# Patient Record
Sex: FEMALE | Race: WHITE | Employment: OTHER | URBAN - METROPOLITAN AREA
[De-identification: names, ages, dates, MRNs, and addresses within clinical notes are randomized per-mention and may not be internally consistent; named-entity substitution may affect disease eponyms.]

---

## 2017-07-19 ENCOUNTER — OFFICE VISIT (OUTPATIENT)
Dept: FAMILY MEDICINE CLINIC | Facility: CLINIC | Age: 62
End: 2017-07-19

## 2017-07-19 VITALS
OXYGEN SATURATION: 98 % | SYSTOLIC BLOOD PRESSURE: 122 MMHG | WEIGHT: 130 LBS | TEMPERATURE: 98 F | HEIGHT: 63.5 IN | BODY MASS INDEX: 22.75 KG/M2 | DIASTOLIC BLOOD PRESSURE: 80 MMHG | HEART RATE: 98 BPM

## 2017-07-19 DIAGNOSIS — M81.0 AGE-RELATED OSTEOPOROSIS WITHOUT CURRENT PATHOLOGICAL FRACTURE: Chronic | ICD-10-CM

## 2017-07-19 DIAGNOSIS — K58.2 IRRITABLE BOWEL SYNDROME WITH BOTH CONSTIPATION AND DIARRHEA: ICD-10-CM

## 2017-07-19 DIAGNOSIS — Z12.4 SCREENING FOR MALIGNANT NEOPLASM OF CERVIX: ICD-10-CM

## 2017-07-19 DIAGNOSIS — Z00.00 ROUTINE MEDICAL EXAM: Primary | ICD-10-CM

## 2017-07-19 DIAGNOSIS — K63.5 BENIGN COLON POLYP: ICD-10-CM

## 2017-07-19 DIAGNOSIS — Z12.11 SCREENING FOR COLON CANCER: ICD-10-CM

## 2017-07-19 DIAGNOSIS — Z12.31 SCREENING MAMMOGRAM, ENCOUNTER FOR: ICD-10-CM

## 2017-07-19 PROCEDURE — 90471 IMMUNIZATION ADMIN: CPT | Performed by: FAMILY MEDICINE

## 2017-07-19 PROCEDURE — 99386 PREV VISIT NEW AGE 40-64: CPT | Performed by: FAMILY MEDICINE

## 2017-07-19 PROCEDURE — 90715 TDAP VACCINE 7 YRS/> IM: CPT | Performed by: FAMILY MEDICINE

## 2017-07-19 RX ORDER — HYOSCYAMINE SULFATE 0.125 MG
TABLET ORAL
Qty: 40 TABLET | Refills: 1 | Status: SHIPPED | OUTPATIENT
Start: 2017-07-19 | End: 2019-04-16 | Stop reason: ALTCHOICE

## 2017-07-19 RX ORDER — AZELAIC ACID 0.15 G/G
GEL TOPICAL 2 TIMES DAILY
COMMUNITY

## 2017-07-19 NOTE — H&P
HPI:   Patient presents with:  Physical: mammogram  Diarrhea      Regina Segundo is a 58year old female who presents for a complete physical exam without pap/gyne exam.     Patient has new complaints of:  · Rare episodes of explosive watery diarrhea fibrilation [OTHER] Father    • Hyperlipidemia [OTHER] Father    • Dementia Mother    • Depression Mother    • Osteoporosis Stellahe Johnston Mother    • Heart Disease Paternal Grandfather    • Asthma Paternal Grandmother    • Osteoporosis [OTHER] Paternal Marva Monroe 63.5\"   Wt 130 lb   SpO2 98%   BMI 22.67 kg/m²  Estimated body mass index is 22.67 kg/m² as calculated from the following:    Height as of this encounter: 63.5\". Weight as of this encounter: 130 lb.   Wt Readings from Last 3 Encounters:  07/19/17 : 130 osteoporosis. · Recommend dietary calcium and Vit D  · Fasting Lipids, Vit D (if covered by insurance), CMP, TSH and CBC annually: due now  · Screening Colonoscopy: q 5 years as h/o colon polyps, last 2015-no polyps/NL,  [facility]  · Immunizations:  Tdap ,

## 2017-07-24 ENCOUNTER — LAB ENCOUNTER (OUTPATIENT)
Dept: LAB | Age: 62
End: 2017-07-24
Attending: FAMILY MEDICINE
Payer: COMMERCIAL

## 2017-07-24 DIAGNOSIS — M81.0 AGE-RELATED OSTEOPOROSIS WITHOUT CURRENT PATHOLOGICAL FRACTURE: Chronic | ICD-10-CM

## 2017-07-24 DIAGNOSIS — Z00.00 ROUTINE MEDICAL EXAM: ICD-10-CM

## 2017-07-24 LAB
25-HYDROXYVITAMIN D (TOTAL): 8.3 NG/ML (ref 30–100)
ALBUMIN SERPL-MCNC: 3.8 G/DL (ref 3.5–4.8)
ALP LIVER SERPL-CCNC: 91 U/L (ref 50–130)
ALT SERPL-CCNC: 26 U/L (ref 14–54)
AST SERPL-CCNC: 17 U/L (ref 15–41)
BASOPHILS # BLD AUTO: 0.05 X10(3) UL (ref 0–0.1)
BASOPHILS NFR BLD AUTO: 1 %
BILIRUB SERPL-MCNC: 0.5 MG/DL (ref 0.1–2)
BUN BLD-MCNC: 7 MG/DL (ref 8–20)
CALCIUM BLD-MCNC: 9.1 MG/DL (ref 8.3–10.3)
CHLORIDE: 108 MMOL/L (ref 101–111)
CHOLEST SMN-MCNC: 196 MG/DL (ref ?–200)
CO2: 26 MMOL/L (ref 22–32)
CREAT BLD-MCNC: 0.61 MG/DL (ref 0.55–1.02)
EOSINOPHIL # BLD AUTO: 0.07 X10(3) UL (ref 0–0.3)
EOSINOPHIL NFR BLD AUTO: 1.4 %
ERYTHROCYTE [DISTWIDTH] IN BLOOD BY AUTOMATED COUNT: 12.1 % (ref 11.5–16)
GLUCOSE BLD-MCNC: 92 MG/DL (ref 70–99)
HCT VFR BLD AUTO: 41.8 % (ref 34–50)
HDLC SERPL-MCNC: 61 MG/DL (ref 45–?)
HDLC SERPL: 3.21 {RATIO} (ref ?–4.44)
HGB BLD-MCNC: 13.4 G/DL (ref 12–16)
IMMATURE GRANULOCYTE COUNT: 0.01 X10(3) UL (ref 0–1)
IMMATURE GRANULOCYTE RATIO %: 0.2 %
LDLC SERPL CALC-MCNC: 110 MG/DL (ref ?–130)
LDLC SERPL-MCNC: 25 MG/DL (ref 5–40)
LYMPHOCYTES # BLD AUTO: 1.85 X10(3) UL (ref 0.9–4)
LYMPHOCYTES NFR BLD AUTO: 36.9 %
M PROTEIN MFR SERPL ELPH: 7.1 G/DL (ref 6.1–8.3)
MCH RBC QN AUTO: 29.7 PG (ref 27–33.2)
MCHC RBC AUTO-ENTMCNC: 32.1 G/DL (ref 31–37)
MCV RBC AUTO: 92.7 FL (ref 81–100)
MONOCYTES # BLD AUTO: 0.52 X10(3) UL (ref 0.1–0.6)
MONOCYTES NFR BLD AUTO: 10.4 %
NEUTROPHIL ABS PRELIM: 2.52 X10 (3) UL (ref 1.3–6.7)
NEUTROPHILS # BLD AUTO: 2.52 X10(3) UL (ref 1.3–6.7)
NEUTROPHILS NFR BLD AUTO: 50.1 %
NONHDLC SERPL-MCNC: 135 MG/DL (ref ?–130)
PLATELET # BLD AUTO: 225 10(3)UL (ref 150–450)
POTASSIUM SERPL-SCNC: 3.9 MMOL/L (ref 3.6–5.1)
RBC # BLD AUTO: 4.51 X10(6)UL (ref 3.8–5.1)
RED CELL DISTRIBUTION WIDTH-SD: 41.2 FL (ref 35.1–46.3)
SODIUM SERPL-SCNC: 142 MMOL/L (ref 136–144)
TRIGLYCERIDES: 123 MG/DL (ref ?–150)
TSI SER-ACNC: 1.41 MIU/ML (ref 0.35–5.5)
WBC # BLD AUTO: 5 X10(3) UL (ref 4–13)

## 2017-07-24 PROCEDURE — 80061 LIPID PANEL: CPT

## 2017-07-24 PROCEDURE — 36415 COLL VENOUS BLD VENIPUNCTURE: CPT

## 2017-07-24 PROCEDURE — 82306 VITAMIN D 25 HYDROXY: CPT

## 2017-07-24 PROCEDURE — 84443 ASSAY THYROID STIM HORMONE: CPT

## 2017-07-24 PROCEDURE — 80053 COMPREHEN METABOLIC PANEL: CPT

## 2017-07-24 PROCEDURE — 85025 COMPLETE CBC W/AUTO DIFF WBC: CPT

## 2017-07-27 DIAGNOSIS — E55.9 VITAMIN D DEFICIENCY: Primary | ICD-10-CM

## 2017-07-27 RX ORDER — ERGOCALCIFEROL 1.25 MG/1
50000 CAPSULE ORAL WEEKLY
Qty: 12 CAPSULE | Refills: 0 | Status: SHIPPED | OUTPATIENT
Start: 2017-07-27 | End: 2017-09-26

## 2017-08-01 ENCOUNTER — HOSPITAL (OUTPATIENT)
Dept: OTHER | Age: 62
End: 2017-08-01
Attending: FAMILY MEDICINE

## 2017-08-07 ENCOUNTER — TELEPHONE (OUTPATIENT)
Dept: FAMILY MEDICINE CLINIC | Facility: CLINIC | Age: 62
End: 2017-08-07

## 2017-08-19 ENCOUNTER — TELEPHONE (OUTPATIENT)
Dept: FAMILY MEDICINE CLINIC | Facility: CLINIC | Age: 62
End: 2017-08-19

## 2017-09-26 ENCOUNTER — LAB ENCOUNTER (OUTPATIENT)
Dept: LAB | Age: 62
End: 2017-09-26
Attending: FAMILY MEDICINE
Payer: COMMERCIAL

## 2017-09-26 ENCOUNTER — OFFICE VISIT (OUTPATIENT)
Dept: FAMILY MEDICINE CLINIC | Facility: CLINIC | Age: 62
End: 2017-09-26

## 2017-09-26 VITALS
HEART RATE: 80 BPM | OXYGEN SATURATION: 98 % | HEIGHT: 63.25 IN | WEIGHT: 126 LBS | TEMPERATURE: 98 F | DIASTOLIC BLOOD PRESSURE: 70 MMHG | BODY MASS INDEX: 22.05 KG/M2 | SYSTOLIC BLOOD PRESSURE: 118 MMHG

## 2017-09-26 DIAGNOSIS — F41.9 ANXIETY: ICD-10-CM

## 2017-09-26 DIAGNOSIS — E55.9 VITAMIN D DEFICIENCY: ICD-10-CM

## 2017-09-26 DIAGNOSIS — R19.7 DIARRHEA, UNSPECIFIED TYPE: Primary | ICD-10-CM

## 2017-09-26 DIAGNOSIS — K58.2 IRRITABLE BOWEL SYNDROME WITH BOTH CONSTIPATION AND DIARRHEA: ICD-10-CM

## 2017-09-26 DIAGNOSIS — H10.13 ALLERGIC CONJUNCTIVITIS, BILATERAL: ICD-10-CM

## 2017-09-26 DIAGNOSIS — R19.7 DIARRHEA, UNSPECIFIED TYPE: ICD-10-CM

## 2017-09-26 LAB — IGA SERPL-MCNC: 205 MG/DL (ref 68–378)

## 2017-09-26 PROCEDURE — 82784 ASSAY IGA/IGD/IGG/IGM EACH: CPT

## 2017-09-26 PROCEDURE — 36415 COLL VENOUS BLD VENIPUNCTURE: CPT

## 2017-09-26 PROCEDURE — 83516 IMMUNOASSAY NONANTIBODY: CPT

## 2017-09-26 PROCEDURE — 99214 OFFICE O/P EST MOD 30 MIN: CPT | Performed by: FAMILY MEDICINE

## 2017-09-26 PROCEDURE — 82306 VITAMIN D 25 HYDROXY: CPT

## 2017-09-26 RX ORDER — ESCITALOPRAM OXALATE 10 MG/1
TABLET ORAL
Qty: 30 TABLET | Refills: 1 | Status: SHIPPED | OUTPATIENT
Start: 2017-09-26 | End: 2019-04-16 | Stop reason: ALTCHOICE

## 2017-09-26 RX ORDER — ERGOCALCIFEROL 1.25 MG/1
50000 CAPSULE ORAL WEEKLY
Qty: 12 CAPSULE | Refills: 1 | Status: SHIPPED | OUTPATIENT
Start: 2017-09-26 | End: 2017-10-09

## 2017-09-26 RX ORDER — OLOPATADINE HYDROCHLORIDE 1 MG/ML
1 SOLUTION/ DROPS OPHTHALMIC 2 TIMES DAILY
Qty: 5 ML | Refills: 3 | Status: SHIPPED | OUTPATIENT
Start: 2017-09-26 | End: 2019-04-16

## 2017-09-26 NOTE — PROGRESS NOTES
HPI:   Patient presents with:  Diarrhea: for 4 months   Cade Duarte is a 58year old female who presents for chronic diarrhea x 4 months and associated anxiety    Watery diarrhea abdominal pain:  · She also complains of: an Rfl:    Hyoscyamine Sulfate 0.125 MG Oral Tab 1-2 tabs q 6-8 hours prn IBS Disp: 40 tablet Rfl: 1      Past Medical History:   Diagnosis Date   • Fibromyalgia    • Irritable bowel syndrome    • Osteopenia    • PONV (postoperative nausea and vomiting) intact B UE and LE, nl gait  MS: no significant deformity, FROM B UE/LE  PSYCH:  mood and affect mildly anxious but otherwise within normal limits, speech and thought congruent, no SHIP  PSYCH: Appearance: well groomed and neatly dressed    ASSESSMENT AND present management: Vitamin D 50,000 units weekly through winter at a minimum, will recheck in about 6-9 months, follow-up with me then  - ergocalciferol 19039 units Oral Cap; Take 1 capsule (50,000 Units total) by mouth once a week.   Dispense: 12 capsule;

## 2017-09-27 LAB
25(OH)D3 SERPL-MCNC: 42.1 NG/ML
TTG IGA SER-ACNC: 0.5 U/ML (ref ?–7)

## 2017-10-09 DIAGNOSIS — E55.9 VITAMIN D DEFICIENCY: ICD-10-CM

## 2017-10-09 RX ORDER — ERGOCALCIFEROL 1.25 MG/1
CAPSULE ORAL
Qty: 12 CAPSULE | Refills: 1 | Status: SHIPPED | OUTPATIENT
Start: 2017-10-09 | End: 2019-04-16 | Stop reason: ALTCHOICE

## 2019-02-19 ENCOUNTER — HOSPITAL (OUTPATIENT)
Dept: OTHER | Age: 64
End: 2019-02-19
Attending: FAMILY MEDICINE

## 2019-04-16 ENCOUNTER — OFFICE VISIT (OUTPATIENT)
Dept: FAMILY MEDICINE CLINIC | Facility: CLINIC | Age: 64
End: 2019-04-16
Payer: COMMERCIAL

## 2019-04-16 VITALS
BODY MASS INDEX: 23.1 KG/M2 | RESPIRATION RATE: 16 BRPM | WEIGHT: 132 LBS | DIASTOLIC BLOOD PRESSURE: 80 MMHG | SYSTOLIC BLOOD PRESSURE: 126 MMHG | OXYGEN SATURATION: 98 % | HEIGHT: 63.5 IN | HEART RATE: 78 BPM

## 2019-04-16 DIAGNOSIS — E55.9 VITAMIN D DEFICIENCY: ICD-10-CM

## 2019-04-16 DIAGNOSIS — Z12.11 SCREENING FOR COLON CANCER: ICD-10-CM

## 2019-04-16 DIAGNOSIS — H10.13 ALLERGIC CONJUNCTIVITIS, BILATERAL: ICD-10-CM

## 2019-04-16 DIAGNOSIS — Z00.00 ROUTINE MEDICAL EXAM: Primary | ICD-10-CM

## 2019-04-16 DIAGNOSIS — L60.3 NAIL DYSTROPHY: ICD-10-CM

## 2019-04-16 DIAGNOSIS — M81.0 AGE-RELATED OSTEOPOROSIS WITHOUT CURRENT PATHOLOGICAL FRACTURE: ICD-10-CM

## 2019-04-16 PROBLEM — F41.1 GENERALIZED ANXIETY DISORDER: Status: ACTIVE | Noted: 2019-04-16

## 2019-04-16 PROCEDURE — 90750 HZV VACC RECOMBINANT IM: CPT | Performed by: FAMILY MEDICINE

## 2019-04-16 PROCEDURE — 99396 PREV VISIT EST AGE 40-64: CPT | Performed by: FAMILY MEDICINE

## 2019-04-16 PROCEDURE — 90471 IMMUNIZATION ADMIN: CPT | Performed by: FAMILY MEDICINE

## 2019-04-16 RX ORDER — TERBINAFINE HYDROCHLORIDE 250 MG/1
250 TABLET ORAL DAILY
COMMUNITY
End: 2019-07-02 | Stop reason: ALTCHOICE

## 2019-04-16 RX ORDER — OLOPATADINE HYDROCHLORIDE 1 MG/ML
1 SOLUTION/ DROPS OPHTHALMIC 2 TIMES DAILY
Qty: 5 ML | Refills: 3 | Status: SHIPPED | OUTPATIENT
Start: 2019-04-16

## 2019-04-16 NOTE — H&P
HPI:   Patient presents with:  Belgica Freed is a 59year old female who presents for a complete physical exam without pap/gyne exam.     Patient has new complaints of:  · F/u osteoporosis, last DEXA 8/2017-8.5% decr in lumbar spine, n History:   Procedure Laterality Date   • ARTHROSCOPY, SHOULDER, SURGI Left    • BREAST BIOPSY Left 1991   • BSO, OMENTECTOMY W/MEAGAN      Age 37   • COLONOSCOPY N/A 8/6/2015    Performed by Chel Powers MD at [facility] ENDOSCOPY   • HYSTERECTOMY     • IR CENTRAL No vision change or complaints  EARS:  No hearing loss, no ear pain  MOUTH/THROAT:  No sore throat or dental problems, no oral lesions  HEART:  No chest pain or palpitations  LUNG:  No SOB, cough or wheeze  GI:  No abdominal pain.   No N/V/D/C  :  No dysu exam.  She is in good health. Her weight is stable, Estimated body mass index is 23.02 kg/m² as calculated from the following:    Height as of this encounter: 63.5\". Weight as of this encounter: 132 lb. .   Discussed importance of exercise and healthy now-See above  - VITAMIN D, 25-HYDROXY; Future      The patient indicates understanding of the above recommendations and agrees to the above plan.   Follow up: as above    Orders Placed This Encounter      Vitamin D, 25-Hydroxy [E]      CBC      CMP      LI

## 2019-05-14 ENCOUNTER — TELEPHONE (OUTPATIENT)
Dept: FAMILY MEDICINE CLINIC | Facility: CLINIC | Age: 64
End: 2019-05-14

## 2019-05-14 DIAGNOSIS — Z00.00 ROUTINE MEDICAL EXAM: Primary | ICD-10-CM

## 2019-05-14 NOTE — TELEPHONE ENCOUNTER
Rcvd request for H&P from [facility]. Pt scheduled for cataract surgery:  R eye 7/10/19  L eye 8/7/19  Pt will have a monitored anesthesia care (MAC) local anesthesia together with sedation and analgesia.    LOV 4/16/19 Routine Exam/Phx; ok t

## 2019-05-15 NOTE — TELEPHONE ENCOUNTER
PT was left VM regarding her H & P being fine for pre op but needs additional labs prior to the surgery. Pt advised to call back.

## 2019-05-20 NOTE — TELEPHONE ENCOUNTER
Spoke to pt, she will have labs completed but wants to confirm an EKG is necessary. Pt has paperwork from [facility], which states she does not need labs or an EKG unless PCP recommends.  Would you like the patient to complete the EKG (did in

## 2019-05-21 NOTE — TELEPHONE ENCOUNTER
If Surgeon does not require EKG then OK not to do pre-op, but she has not had EKG since 2015 (last in chart) so goood to do for routine  screening purposes antway

## 2019-05-21 NOTE — TELEPHONE ENCOUNTER
Spoke with patient, advised Dr. Tony Aguayo suggests if surgeon does not require EKG then ok not to do pre-op. Advised to still complete EKG for screening purposes since last was done in 2015. Patient verbalizes understanding.

## 2019-06-13 DIAGNOSIS — E55.9 VITAMIN D DEFICIENCY: Primary | ICD-10-CM

## 2019-06-13 DIAGNOSIS — E78.5 DYSLIPIDEMIA: ICD-10-CM

## 2019-07-02 ENCOUNTER — OFFICE VISIT (OUTPATIENT)
Dept: FAMILY MEDICINE CLINIC | Facility: CLINIC | Age: 64
End: 2019-07-02
Payer: COMMERCIAL

## 2019-07-02 VITALS
TEMPERATURE: 98 F | BODY MASS INDEX: 22.23 KG/M2 | WEIGHT: 127 LBS | OXYGEN SATURATION: 98 % | RESPIRATION RATE: 17 BRPM | SYSTOLIC BLOOD PRESSURE: 100 MMHG | HEART RATE: 77 BPM | HEIGHT: 63.5 IN | DIASTOLIC BLOOD PRESSURE: 62 MMHG

## 2019-07-02 DIAGNOSIS — H25.9 AGE-RELATED CATARACT OF BOTH EYES, UNSPECIFIED AGE-RELATED CATARACT TYPE: ICD-10-CM

## 2019-07-02 DIAGNOSIS — K59.09 OTHER CONSTIPATION: ICD-10-CM

## 2019-07-02 DIAGNOSIS — E78.5 DYSLIPIDEMIA: ICD-10-CM

## 2019-07-02 DIAGNOSIS — Z01.818 PRE-OPERATIVE GENERAL PHYSICAL EXAMINATION: Primary | ICD-10-CM

## 2019-07-02 DIAGNOSIS — Z23 NEED FOR SHINGLES VACCINE: ICD-10-CM

## 2019-07-02 PROCEDURE — 99244 OFF/OP CNSLTJ NEW/EST MOD 40: CPT | Performed by: FAMILY MEDICINE

## 2019-07-02 NOTE — H&P
HPI:   Patient presents with:  Pre-Op Exam: [facility]. Cataract extraction with intraocular lens implant (MAC anesthesia) with Dr. Pauline Ferrari; R 7/10/2019, L 8/7/2019.  Needed: H&P clearance  Constipation  Hyperlipidemia: test done after SHOULDER, SURGI Left    • BREAST BIOPSY Left 1991   • BSO, OMENTECTOMY W/MEAGAN      Age 37   • COLONOSCOPY N/A 8/6/2015    Performed by Ulices Matute MD at [facility] ENDOSCOPY   • HYSTERECTOMY     • IR CENTRAL LINE (TLC) [facility] Pulse 77   Temp 97.8 °F (36.6 °C) (Oral)   Resp 17   Ht 63.5\"   Wt 127 lb   SpO2 98%   BMI 22.14 kg/m²  Body mass index is 22.14 kg/m².   GENERAL: well developed, well nourished, female  in no apparent distress  SKIN: no suspicious lesions,  skin color wn age-related cataract type  See above    3.  Dyslipidemia, new onset, may be due to recent 3-month course of Lamisil, now complete  Not on medication, recommend low-fat and cholesterol diet, check calcium score the heart, check EKG, recheck lipids in 6 month

## 2019-07-16 ENCOUNTER — TELEPHONE (OUTPATIENT)
Dept: FAMILY MEDICINE CLINIC | Facility: CLINIC | Age: 64
End: 2019-07-16

## 2019-07-16 NOTE — TELEPHONE ENCOUNTER
Rcvd pt's medical record-Colonoscopy dated 6/18/2018. Per  last colonoscopy 8/6/15 (5 yr plan). Enter new date and change  to show next Colonoscopy in 2023?

## 2019-08-13 ENCOUNTER — TELEPHONE (OUTPATIENT)
Dept: FAMILY MEDICINE CLINIC | Facility: CLINIC | Age: 64
End: 2019-08-13

## 2019-08-20 ENCOUNTER — NURSE ONLY (OUTPATIENT)
Dept: FAMILY MEDICINE CLINIC | Facility: CLINIC | Age: 64
End: 2019-08-20
Payer: COMMERCIAL

## 2019-08-20 DIAGNOSIS — Z23 NEED FOR SHINGLES VACCINE: Primary | ICD-10-CM

## 2019-08-20 PROCEDURE — 90471 IMMUNIZATION ADMIN: CPT | Performed by: FAMILY MEDICINE

## 2019-08-20 PROCEDURE — 90750 HZV VACC RECOMBINANT IM: CPT | Performed by: FAMILY MEDICINE

## 2019-08-20 NOTE — PROGRESS NOTES
Patient comes to office for Shingrix #2 vaccine  Vaccine consent form & waver signed. Vaccine inj'd to patient's L deltoid  Patient tolerated well with no complications. Patient advised to go to ER with any adverse reactions.   Patient verbalizes Kedar Shepard

## 2020-02-19 ENCOUNTER — APPOINTMENT (OUTPATIENT)
Dept: LAB | Age: 65
End: 2020-02-19
Attending: FAMILY MEDICINE
Payer: COMMERCIAL

## 2020-02-19 ENCOUNTER — HOSPITAL ENCOUNTER (OUTPATIENT)
Dept: MAMMOGRAPHY | Age: 65
Discharge: HOME | End: 2020-02-20
Attending: FAMILY MEDICINE

## 2020-02-19 ENCOUNTER — HOSPITAL ENCOUNTER (OUTPATIENT)
Dept: BONE DENSITY | Age: 65
Discharge: HOME | End: 2020-02-19
Attending: FAMILY MEDICINE
Payer: COMMERCIAL

## 2020-02-19 DIAGNOSIS — E78.5 DYSLIPIDEMIA: ICD-10-CM

## 2020-02-19 DIAGNOSIS — E55.9 VITAMIN D DEFICIENCY: ICD-10-CM

## 2020-02-19 DIAGNOSIS — M81.0 AGE-RELATED OSTEOPOROSIS WITHOUT CURRENT PATHOLOGICAL FRACTURE: ICD-10-CM

## 2020-02-19 DIAGNOSIS — Z12.31 ENCOUNTER FOR SCREENING MAMMOGRAM FOR MALIGNANT NEOPLASM OF BREAST: ICD-10-CM

## 2020-02-19 DIAGNOSIS — Z12.31 ENCOUNTER FOR SCREENING MAMMOGRAM FOR MALIGNANT NEOPLASM OF BREAST: Primary | ICD-10-CM

## 2020-02-19 LAB
CHOLEST SMN-MCNC: 224 MG/DL (ref ?–200)
HDLC SERPL-MCNC: 58 MG/DL (ref 40–59)
LDLC SERPL CALC-MCNC: 128 MG/DL (ref ?–100)
NONHDLC SERPL-MCNC: 166 MG/DL (ref ?–130)
PATIENT FASTING Y/N/NP: YES
TRIGL SERPL-MCNC: 189 MG/DL (ref 30–149)
VIT D+METAB SERPL-MCNC: 27.7 NG/ML (ref 30–100)
VLDLC SERPL CALC-MCNC: 38 MG/DL (ref 0–30)

## 2020-02-19 PROCEDURE — 82306 VITAMIN D 25 HYDROXY: CPT | Performed by: FAMILY MEDICINE

## 2020-02-19 PROCEDURE — 80061 LIPID PANEL: CPT | Performed by: FAMILY MEDICINE

## 2020-02-19 PROCEDURE — 77080 DXA BONE DENSITY AXIAL: CPT | Performed by: FAMILY MEDICINE

## 2020-02-19 PROCEDURE — 77063 BREAST TOMOSYNTHESIS BI: CPT

## 2020-02-19 PROCEDURE — 36415 COLL VENOUS BLD VENIPUNCTURE: CPT | Performed by: FAMILY MEDICINE

## 2020-02-20 ENCOUNTER — TELEPHONE (OUTPATIENT)
Dept: FAMILY MEDICINE CLINIC | Facility: CLINIC | Age: 65
End: 2020-02-20

## 2020-02-20 NOTE — TELEPHONE ENCOUNTER
Rec'd pt's mammogram report done on 2/19/2020. Findings: scattered fibroglandular elements in both breasts. No significant masses, calcifications or findings seen. 1 year mammogram recommended. Entered in HM.

## 2020-02-22 DIAGNOSIS — E55.9 VITAMIN D DEFICIENCY: Primary | ICD-10-CM

## 2020-02-22 DIAGNOSIS — E78.5 DYSLIPIDEMIA: ICD-10-CM

## 2021-03-15 DIAGNOSIS — Z23 NEED FOR VACCINATION: ICD-10-CM

## 2021-04-02 ENCOUNTER — HOSPITAL ENCOUNTER (OUTPATIENT)
Dept: MAMMOGRAPHY | Age: 66
Discharge: HOME | End: 2021-04-02
Attending: FAMILY MEDICINE

## 2021-04-02 ENCOUNTER — TELEPHONE (OUTPATIENT)
Dept: INTEGRATIVE MEDICINE | Facility: CLINIC | Age: 66
End: 2021-04-02

## 2021-04-02 DIAGNOSIS — Z12.31 ENCOUNTER FOR SCREENING MAMMOGRAM FOR BREAST CANCER: ICD-10-CM

## 2021-04-02 PROCEDURE — 77067 SCR MAMMO BI INCL CAD: CPT

## 2021-04-02 PROCEDURE — 77063 BREAST TOMOSYNTHESIS BI: CPT

## 2021-04-05 ENCOUNTER — TELEPHONE (OUTPATIENT)
Dept: FAMILY MEDICINE CLINIC | Facility: CLINIC | Age: 66
End: 2021-04-05

## 2021-04-05 NOTE — TELEPHONE ENCOUNTER
Received patient's mammogram results from [address] [place] [address]. No recent imaging ordered by Dr. Melvina Waddell. LOV 7/2/19 for pre-op; due 7/20/2020 for CPE. No upcoming appts scheduled. Report placed on provider's desk for review.

## 2021-04-20 ENCOUNTER — MED REC SCAN ONLY (OUTPATIENT)
Dept: FAMILY MEDICINE CLINIC | Facility: CLINIC | Age: 66
End: 2021-04-20

## 2021-06-10 ENCOUNTER — MED REC SCAN ONLY (OUTPATIENT)
Dept: FAMILY MEDICINE CLINIC | Facility: CLINIC | Age: 66
End: 2021-06-10

## 2021-08-25 ENCOUNTER — OFFICE VISIT (OUTPATIENT)
Dept: FAMILY MEDICINE CLINIC | Facility: CLINIC | Age: 66
End: 2021-08-25
Payer: COMMERCIAL

## 2021-08-25 VITALS
WEIGHT: 124 LBS | HEIGHT: 63.5 IN | OXYGEN SATURATION: 97 % | BODY MASS INDEX: 21.7 KG/M2 | HEART RATE: 94 BPM | SYSTOLIC BLOOD PRESSURE: 108 MMHG | DIASTOLIC BLOOD PRESSURE: 72 MMHG

## 2021-08-25 DIAGNOSIS — K63.5 BENIGN COLON POLYP: ICD-10-CM

## 2021-08-25 DIAGNOSIS — F41.1 GENERALIZED ANXIETY DISORDER: ICD-10-CM

## 2021-08-25 DIAGNOSIS — Z00.00 ROUTINE MEDICAL EXAM: Primary | ICD-10-CM

## 2021-08-25 DIAGNOSIS — M81.0 AGE-RELATED OSTEOPOROSIS WITHOUT CURRENT PATHOLOGICAL FRACTURE: ICD-10-CM

## 2021-08-25 DIAGNOSIS — M62.838 MUSCLE SPASM: ICD-10-CM

## 2021-08-25 DIAGNOSIS — K58.2 IRRITABLE BOWEL SYNDROME WITH BOTH CONSTIPATION AND DIARRHEA: ICD-10-CM

## 2021-08-25 DIAGNOSIS — Z12.11 SCREENING FOR COLON CANCER: ICD-10-CM

## 2021-08-25 DIAGNOSIS — E55.9 VITAMIN D DEFICIENCY: ICD-10-CM

## 2021-08-25 PROCEDURE — 3078F DIAST BP <80 MM HG: CPT | Performed by: FAMILY MEDICINE

## 2021-08-25 PROCEDURE — 99213 OFFICE O/P EST LOW 20 MIN: CPT | Performed by: FAMILY MEDICINE

## 2021-08-25 PROCEDURE — 3074F SYST BP LT 130 MM HG: CPT | Performed by: FAMILY MEDICINE

## 2021-08-25 PROCEDURE — 3008F BODY MASS INDEX DOCD: CPT | Performed by: FAMILY MEDICINE

## 2021-08-25 PROCEDURE — 99397 PER PM REEVAL EST PAT 65+ YR: CPT | Performed by: FAMILY MEDICINE

## 2021-08-30 NOTE — H&P
HPI:   Patient presents with:  Physical: sister had Breast cancer and brother prostate cancer  Anxiety  Abnormal Labs  Osteoporosis      Heriberto Cochran is a 77year old female who presents for a complete physical exam without pap/gyne exam.     Angelica 1991   • BSO, OMENTECTOMY W/MEAGAN      Age 37   • COLONOSCOPY N/A 8/6/2015    Procedure: COLONOSCOPY;  Surgeon: Brandon Drake MD;  Location: [facility] ENDOSCOPY   • HYSTERECTOMY     • IR CENTRAL LINE (TLC) PLACEMENT  1989    [email or web address]      Family History   Prob headaches, no dizziness  EYES:  No new vision change or complaints  EARS:  No new hearing loss, no ear pain  MOUTH/THROAT:  No sore throat or dental problems, no oral lesions  HEART:  No chest pain or palpitations  LUNG:  No SOB, persistent cough or wheeze 21.62 kg/m² as calculated from the following:    Height as of this encounter: 5' 3.5\" (1.613 m). Weight as of this encounter: 124 lb (56.2 kg). .   Stress importance of exercise and healthy well balanced diet.  Recommend low fat DASH diet and aerobic exe MAGNESIUM      Orders Placed This Encounter      Vitamin D      TSH W Reflex To Free T4      Magnesium      Lipid Panel      CBC With Differential With Platelet      Comp Metabolic Panel (14)      Meds & Refills for this Visit:  Requested Prescriptions

## 2021-09-01 LAB
ABSOLUTE BASOPHILS: 60 CELLS/UL (ref 0–200)
ABSOLUTE EOSINOPHILS: 90 CELLS/UL (ref 15–500)
ABSOLUTE LYMPHOCYTES: 1764 CELLS/UL (ref 850–3900)
ABSOLUTE MONOCYTES: 390 CELLS/UL (ref 200–950)
ABSOLUTE NEUTROPHILS: 3696 CELLS/UL (ref 1500–7800)
ALBUMIN/GLOBULIN RATIO: 1.9 (CALC) (ref 1–2.5)
ALBUMIN: 4.4 G/DL (ref 3.6–5.1)
ALKALINE PHOSPHATASE: 93 U/L (ref 37–153)
ALT: 40 U/L (ref 6–29)
AST: 34 U/L (ref 10–35)
BASOPHILS: 1 %
BILIRUBIN, TOTAL: 0.5 MG/DL (ref 0.2–1.2)
BUN: 12 MG/DL (ref 7–25)
CALCIUM: 9.6 MG/DL (ref 8.6–10.4)
CARBON DIOXIDE: 25 MMOL/L (ref 20–32)
CHLORIDE: 105 MMOL/L (ref 98–110)
CHOL/HDLC RATIO: 4.4 (CALC)
CHOLESTEROL, TOTAL: 244 MG/DL
CREATININE: 0.68 MG/DL (ref 0.5–0.99)
EGFR IF AFRICN AM: 106 ML/MIN/1.73M2
EGFR IF NONAFRICN AM: 91 ML/MIN/1.73M2
EOSINOPHILS: 1.5 %
GLOBULIN: 2.3 G/DL (CALC) (ref 1.9–3.7)
GLUCOSE: 90 MG/DL (ref 65–99)
HDL CHOLESTEROL: 56 MG/DL
HEMATOCRIT: 43.8 % (ref 35–45)
HEMOGLOBIN: 14.3 G/DL (ref 11.7–15.5)
LDL-CHOLESTEROL: 154 MG/DL (CALC)
LYMPHOCYTES: 29.4 %
MAGNESIUM: 2.1 MG/DL (ref 1.5–2.5)
MCH: 29.4 PG (ref 27–33)
MCHC: 32.6 G/DL (ref 32–36)
MCV: 89.9 FL (ref 80–100)
MONOCYTES: 6.5 %
MPV: 12.7 FL (ref 7.5–12.5)
NEUTROPHILS: 61.6 %
NON-HDL CHOLESTEROL: 188 MG/DL (CALC)
PLATELET COUNT: 256 THOUSAND/UL (ref 140–400)
POTASSIUM: 4.2 MMOL/L (ref 3.5–5.3)
PROTEIN, TOTAL: 6.7 G/DL (ref 6.1–8.1)
RDW: 12.3 % (ref 11–15)
RED BLOOD CELL COUNT: 4.87 MILLION/UL (ref 3.8–5.1)
SODIUM: 141 MMOL/L (ref 135–146)
TRIGLYCERIDES: 202 MG/DL
TSH W/REFLEX TO FT4: 2.53 MIU/L (ref 0.4–4.5)
WHITE BLOOD CELL COUNT: 6 THOUSAND/UL (ref 3.8–10.8)

## 2021-09-03 NOTE — PROGRESS NOTES
Jim Falcon,    Attached are the results of your recently performed labs/tests: All results are essentially within NORMAL limits. EXCEPT:    Your lipids: Cholesterol and Triglycerides were mild to moderately elevated.   I recommend watching

## 2021-09-08 ENCOUNTER — HOSPITAL ENCOUNTER (OUTPATIENT)
Dept: BONE DENSITY | Age: 66
Discharge: HOME | End: 2021-09-08
Attending: FAMILY MEDICINE
Payer: COMMERCIAL

## 2021-09-08 DIAGNOSIS — M81.0 AGE-RELATED OSTEOPOROSIS WITHOUT CURRENT PATHOLOGICAL FRACTURE: ICD-10-CM

## 2021-09-08 PROCEDURE — 77080 DXA BONE DENSITY AXIAL: CPT | Performed by: FAMILY MEDICINE

## 2021-10-25 ENCOUNTER — PATIENT MESSAGE (OUTPATIENT)
Dept: FAMILY MEDICINE CLINIC | Facility: CLINIC | Age: 66
End: 2021-10-25

## 2021-10-25 NOTE — TELEPHONE ENCOUNTER
From: [place]  Sent: 10/25/2021 2:09 PM CDT  To: Skinny Lamar 12 Dr. Balbina Betancur  Subject: Follow up to labs    Would you be able to email the order to me and then I can print it. My email is [email or web address] or fax directly to [facility]. The

## 2021-11-08 ENCOUNTER — OFFICE VISIT (OUTPATIENT)
Dept: FAMILY MEDICINE CLINIC | Facility: CLINIC | Age: 66
End: 2021-11-08
Payer: COMMERCIAL

## 2021-11-08 VITALS
DIASTOLIC BLOOD PRESSURE: 70 MMHG | SYSTOLIC BLOOD PRESSURE: 110 MMHG | WEIGHT: 122.63 LBS | HEART RATE: 109 BPM | HEIGHT: 63.5 IN | RESPIRATION RATE: 16 BRPM | BODY MASS INDEX: 21.46 KG/M2 | OXYGEN SATURATION: 98 %

## 2021-11-08 DIAGNOSIS — Z23 ENCOUNTER FOR IMMUNIZATION: ICD-10-CM

## 2021-11-08 DIAGNOSIS — R79.89 ELEVATED LFTS: Primary | ICD-10-CM

## 2021-11-08 DIAGNOSIS — F41.8 ANXIETY ABOUT HEALTH: ICD-10-CM

## 2021-11-08 DIAGNOSIS — M81.0 AGE-RELATED OSTEOPOROSIS WITHOUT CURRENT PATHOLOGICAL FRACTURE: ICD-10-CM

## 2021-11-08 DIAGNOSIS — R10.11 RUQ PAIN: ICD-10-CM

## 2021-11-08 DIAGNOSIS — E78.2 MIXED HYPERLIPIDEMIA: ICD-10-CM

## 2021-11-08 DIAGNOSIS — M25.551 RIGHT HIP PAIN: ICD-10-CM

## 2021-11-08 PROCEDURE — 3074F SYST BP LT 130 MM HG: CPT | Performed by: NURSE PRACTITIONER

## 2021-11-08 PROCEDURE — 90662 IIV NO PRSV INCREASED AG IM: CPT | Performed by: NURSE PRACTITIONER

## 2021-11-08 PROCEDURE — 90471 IMMUNIZATION ADMIN: CPT | Performed by: NURSE PRACTITIONER

## 2021-11-08 PROCEDURE — 3078F DIAST BP <80 MM HG: CPT | Performed by: NURSE PRACTITIONER

## 2021-11-08 PROCEDURE — 99214 OFFICE O/P EST MOD 30 MIN: CPT | Performed by: NURSE PRACTITIONER

## 2021-11-08 PROCEDURE — 3008F BODY MASS INDEX DOCD: CPT | Performed by: NURSE PRACTITIONER

## 2021-11-08 RX ORDER — RISEDRONATE SODIUM 35 MG/1
35 TABLET, FILM COATED ORAL
Qty: 12 TABLET | Refills: 1 | Status: SHIPPED | OUTPATIENT
Start: 2021-11-08 | End: 2021-11-18

## 2021-11-08 RX ORDER — ROSUVASTATIN CALCIUM 5 MG/1
5 TABLET, COATED ORAL NIGHTLY
Qty: 90 TABLET | Refills: 1 | Status: SHIPPED | OUTPATIENT
Start: 2021-11-08 | End: 2021-11-18

## 2021-11-08 NOTE — PROGRESS NOTES
Chief Complaint:   Patient presents with: Follow - Up: results      HPI:   This is a 77year old female coming in for follow-up on lab results and several concerns.  She reports being extremely anxious about her health in the past several days, which is af hip radiating into groin. Pain in buttocks still comes and goes. Denies lower back pain but reports it feels \"tight. \" No known injury.      Results for orders placed or performed in visit on 11/03/21   LIPID PANEL   Result Value Ref Range    CHOLESTEROL, OMENTECTOMY W/MEAAGN      Age 37   • COLONOSCOPY N/A 8/6/2015    Procedure: COLONOSCOPY;  Surgeon: Jesenia Chavez MD;  Location: [facility] ENDOSCOPY   • HYSTERECTOMY     • IR CENTRAL LINE (TLC) [address] [facility] [place]     Social History:  Social History    T topically 2 (two) times daily. Counseling given: Not Answered       REVIEW OF SYSTEMS:   CONSTITUTIONAL:  Denies unusual weight gain/loss, fever, chills, or fatigue. INTEGUMENTARY:  Denies rashes, itching, skin lesion.   CARDIOVASCULAR:  Denies ches 3 months. If persistently elevated and above workup normal will consider GI referral.   -Would limit ETOH, Tylenol.   - US LIVER (CPT=45705); Future  - LIPID PANEL; Future  - HEPATIC FUNCTION PANEL (7); Future    2. RUQ pain  -See above.    - US LIVER (CPT= mouth every 7 days.          Problem List:  Patient Active Problem List:     Age-related osteoporosis without current pathological fracture     Irritable bowel syndrome with both constipation and diarrhea     Benign colon polyp     Vitamin D deficiency

## 2021-11-08 NOTE — PATIENT INSTRUCTIONS
Medicine for Cholesterol Control  Cholesterol is a waxy substance in your bloodstream. If there is too much of it in your blood, it can build up in the walls of your arteries. Over time, this buildup can lead to coronary disease.  Coronary disease can put muscle aches. Tell your healthcare provider about any side effects you have.    When to call your healthcare provider  When taking your medicine, let your healthcare provider know if you have:   · Yellowing of the whites of eyes  · Blurred vision  · Muscle to:  · Tell your healthcare provider about any other medicines you take. This includes over-the-counter medicines. It also includes vitamins and herbs. · Take your medicine exactly as directed. This helps make sure that it works as it should.   · Don't ski stroke. These include family history, age, gender, ethnicity, and current health. Your healthcare provider can help you get started on a plan to control your cholesterol.   Checking your cholesterol  Your cholesterol is checked with a simple blood test. The and does not block your blood vessels. HDL levels are affected by how much you exercise and what you eat. This is the type of cholesterol that you don't want too little of. The higher the HDL, the better. My HDL cholesterol is:  ________________  · LDL is c effects your medicines may cause. Let your provider know about any side effects you have. Make a plan to have regular cholesterol checks.   Work with your provider to know and understand what your cholesterol numbers mean, what your risk factors are and wha Regular exercise can help in many ways.  It can:   · Raise your good cholesterol  · Help lower your bad cholesterol  · Let blood flow better through your body  · Give more oxygen to your muscles and tissues  · Help you manage your weight  · Help your heart Controlling stress   Learn ways to control stress. This will help you deal with stress in your home and work life. Controlling stress can greatly lower your risk of getting cardiovascular disease.    Making the most of medicines  Healthy eating and exerci levels  · History of diabetes  · Current or past smoking history  · Treatment for high blood pressure  · Treatment of cholesterol with a statin  · Current use of aspirin  If you are uncertain about your risk factors, you and your provider may decide to pro professional medical care. Always follow your healthcare professional's instructions. Osteoporosis Medicines: Bisphosphonates  Depending on your needs, your healthcare provider may prescribe medicines to prevent or treat osteoporosis.      Bisphospho your abdominal muscles. Be sure to continue to breathe. · Lift one bent knee about 2 inches then return it to the floor and lift the other about 2 inches. Keep your abdominal muscles tight and continue to breathe.  These motions should be slow and controll healthcare professional's instructions. Back Exercises: Back Release  Do this exercise on your hands and knees. Keep your knees under your hips and your hands under your shoulders.       · Relax your abdominal and buttocks muscles, lift your head, an legs.   · If there is any pain other than stretch in the knee or buttock, stop and contact your healthcare provider.   For your safety, check with your healthcare provider before starting an exercise program.    Haleigh last reviewed this educational gloria instructions. Back Exercises: Leg Reach      Do this exercise on your hands and knees. Keep your knees under your hips and your hands under your shoulders. Keep your spine in a neutral position (not arched or sagging).  Be sure to maintain your neck’ stretch. [employer]Well last reviewed this educational content on 4/1/2020  © 1041-0916 [employer]. All rights reserved. This information is not intended as a substitute for professional medical care.  Always follow your healthcare professional's shoulder height. · Turn from the waist with hips forward. Turn your head last. Do not push through the pain. · Hold for a count of 10 to 30. Return to starting position. · Repeat 3 to 5 times on one side. Then switch sides.   Haleigh last reviewed this

## 2021-11-10 ENCOUNTER — PATIENT MESSAGE (OUTPATIENT)
Dept: FAMILY MEDICINE CLINIC | Facility: CLINIC | Age: 66
End: 2021-11-10

## 2021-11-10 NOTE — TELEPHONE ENCOUNTER
From: [place] A Ezekiel  To: JAKE Miguel  Sent: 11/10/2021 11:47 AM CST  Subject: Follow up to office visit     Dominga Mayorga, during the office visit on Monday you mentioned hepatitis testing but I don’t see an order for that. Please advise.   Also I

## 2021-11-11 ENCOUNTER — TELEPHONE (OUTPATIENT)
Dept: FAMILY MEDICINE CLINIC | Facility: CLINIC | Age: 66
End: 2021-11-11

## 2021-11-18 ENCOUNTER — PATIENT MESSAGE (OUTPATIENT)
Dept: FAMILY MEDICINE CLINIC | Facility: CLINIC | Age: 66
End: 2021-11-18

## 2021-11-18 DIAGNOSIS — E78.2 MIXED HYPERLIPIDEMIA: ICD-10-CM

## 2021-11-18 DIAGNOSIS — M81.0 AGE-RELATED OSTEOPOROSIS WITHOUT CURRENT PATHOLOGICAL FRACTURE: ICD-10-CM

## 2021-11-18 RX ORDER — RISEDRONATE SODIUM 35 MG/1
35 TABLET, FILM COATED ORAL
Qty: 12 TABLET | Refills: 1 | Status: SHIPPED | OUTPATIENT
Start: 2021-11-18 | End: 2022-05-17

## 2021-11-18 RX ORDER — ROSUVASTATIN CALCIUM 5 MG/1
5 TABLET, COATED ORAL NIGHTLY
Qty: 90 TABLET | Refills: 1 | Status: SHIPPED | OUTPATIENT
Start: 2021-11-18

## 2021-11-18 NOTE — TELEPHONE ENCOUNTER
From: [facility] A Ezekiel  To: JAKE Burton  Sent: 11/18/2021 11:38 AM CST  Subject: Prescription     I was only able to get a 30 day supply of Crestor and Actonel thru [facility] with our insurance. It must be submitted to [facility].  Could the off

## 2021-11-30 ENCOUNTER — HOSPITAL ENCOUNTER (OUTPATIENT)
Dept: ULTRASOUND IMAGING | Age: 66
Discharge: HOME | End: 2021-11-30
Attending: NURSE PRACTITIONER
Payer: COMMERCIAL

## 2021-11-30 ENCOUNTER — NURSE ONLY (OUTPATIENT)
Dept: FAMILY MEDICINE CLINIC | Facility: CLINIC | Age: 66
End: 2021-11-30
Payer: COMMERCIAL

## 2021-11-30 DIAGNOSIS — R10.11 RUQ PAIN: ICD-10-CM

## 2021-11-30 DIAGNOSIS — R79.89 ELEVATED LFTS: ICD-10-CM

## 2021-11-30 PROCEDURE — 90471 IMMUNIZATION ADMIN: CPT | Performed by: FAMILY MEDICINE

## 2021-11-30 PROCEDURE — 90670 PCV13 VACCINE IM: CPT | Performed by: FAMILY MEDICINE

## 2021-11-30 PROCEDURE — 76705 ECHO EXAM OF ABDOMEN: CPT | Performed by: NURSE PRACTITIONER

## 2022-02-10 LAB
AMB EXT BILIRUBIN, TOTAL: 0.5 MG/DL (ref ?–1.3)
AMB EXT CHOL/HDL RATIO: 2.4 (ref ?–4.4)
AMB EXT CHOLESTEROL, TOTAL: 154 MG/DL (ref ?–200)
AMB EXT CMP ALT: 25 U/L (ref 5–46)
AMB EXT CMP AST: 22 U/L (ref 11–40)
AMB EXT HDL CHOLESTEROL: 63 MG/DL (ref 50–?)
AMB EXT LDL CHOLESTEROL, DIRECT: 71 MG/DL (ref ?–100)
AMB EXT NON HDL CHOL: 91 MG/DL (ref ?–129)
AMB EXT TOTAL PROTEIN: 6.8 (ref 6–7.7)
AMB EXT TRIGLYCERIDES: 89 MG/DL (ref ?–150)
AMB EXT VLDL: 20 MG/DL (ref ?–29)

## 2022-02-11 ENCOUNTER — TELEPHONE (OUTPATIENT)
Dept: FAMILY MEDICINE CLINIC | Facility: CLINIC | Age: 67
End: 2022-02-11

## 2022-02-11 NOTE — TELEPHONE ENCOUNTER
Received Lipid Panel and Hepatic Function Panel from [facility] in [address] [place] [address]. Reults entered into External Result Console; full report placed on provider's desk for review. Ordered by LG to be completed 2/1/22.

## 2022-04-25 RX ORDER — ROSUVASTATIN CALCIUM 5 MG/1
TABLET, COATED ORAL
Qty: 90 TABLET | Refills: 1 | Status: SHIPPED | OUTPATIENT
Start: 2022-04-25

## 2022-05-14 ENCOUNTER — OFFICE VISIT (OUTPATIENT)
Dept: ENDOCRINOLOGY CLINIC | Facility: CLINIC | Age: 67
End: 2022-05-14
Payer: COMMERCIAL

## 2022-05-14 VITALS
BODY MASS INDEX: 23 KG/M2 | HEART RATE: 108 BPM | SYSTOLIC BLOOD PRESSURE: 128 MMHG | WEIGHT: 130 LBS | DIASTOLIC BLOOD PRESSURE: 87 MMHG

## 2022-05-14 DIAGNOSIS — M81.8 OTHER OSTEOPOROSIS, UNSPECIFIED PATHOLOGICAL FRACTURE PRESENCE: Primary | ICD-10-CM

## 2022-05-14 DIAGNOSIS — E55.9 VITAMIN D DEFICIENCY: ICD-10-CM

## 2022-05-14 RX ORDER — PEN NEEDLE, DIABETIC 32GX 5/32"
NEEDLE, DISPOSABLE MISCELLANEOUS
Qty: 100 EACH | Refills: 1 | Status: SHIPPED | OUTPATIENT
Start: 2022-05-14

## 2022-05-14 RX ORDER — CHOLECALCIFEROL (VITAMIN D3) 50 MCG
TABLET ORAL DAILY
COMMUNITY

## 2022-05-14 RX ORDER — PHENOL 1.4 %
600 AEROSOL, SPRAY (ML) MUCOUS MEMBRANE
COMMUNITY

## 2022-05-14 RX ORDER — TERIPARATIDE 250 UG/ML
20 INJECTION, SOLUTION SUBCUTANEOUS DAILY
Qty: 2.4 ML | Refills: 5 | Status: SHIPPED | OUTPATIENT
Start: 2022-05-14 | End: 2022-06-11

## 2022-05-17 ENCOUNTER — TELEPHONE (OUTPATIENT)
Dept: ENDOCRINOLOGY CLINIC | Facility: CLINIC | Age: 67
End: 2022-05-17

## 2022-05-17 ENCOUNTER — LAB ENCOUNTER (OUTPATIENT)
Dept: LAB | Facility: HOSPITAL | Age: 67
End: 2022-05-17
Attending: INTERNAL MEDICINE
Payer: COMMERCIAL

## 2022-05-17 DIAGNOSIS — E55.9 VITAMIN D DEFICIENCY: ICD-10-CM

## 2022-05-17 DIAGNOSIS — M81.8 OTHER OSTEOPOROSIS, UNSPECIFIED PATHOLOGICAL FRACTURE PRESENCE: ICD-10-CM

## 2022-05-17 LAB
ANION GAP SERPL CALC-SCNC: 4 MMOL/L (ref 0–18)
BUN BLD-MCNC: 9 MG/DL (ref 7–18)
CALCIUM BLD-MCNC: 9.4 MG/DL (ref 8.5–10.1)
CHLORIDE SERPL-SCNC: 108 MMOL/L (ref 98–112)
CO2 SERPL-SCNC: 27 MMOL/L (ref 21–32)
CREAT BLD-MCNC: 0.69 MG/DL
FASTING STATUS PATIENT QL REPORTED: YES
GLUCOSE BLD-MCNC: 99 MG/DL (ref 70–99)
OSMOLALITY SERPL CALC.SUM OF ELEC: 287 MOSM/KG (ref 275–295)
POTASSIUM SERPL-SCNC: 4.1 MMOL/L (ref 3.5–5.1)
PTH-INTACT SERPL-MCNC: 41.5 PG/ML (ref 18.5–88)
SODIUM SERPL-SCNC: 139 MMOL/L (ref 136–145)
VIT D+METAB SERPL-MCNC: 35.3 NG/ML (ref 30–100)

## 2022-05-17 PROCEDURE — 83970 ASSAY OF PARATHORMONE: CPT

## 2022-05-17 PROCEDURE — 84080 ASSAY ALKALINE PHOSPHATASES: CPT

## 2022-05-17 PROCEDURE — 80048 BASIC METABOLIC PNL TOTAL CA: CPT

## 2022-05-17 PROCEDURE — 82306 VITAMIN D 25 HYDROXY: CPT

## 2022-05-17 PROCEDURE — 82523 COLLAGEN CROSSLINKS: CPT

## 2022-05-17 PROCEDURE — 36415 COLL VENOUS BLD VENIPUNCTURE: CPT

## 2022-05-17 NOTE — TELEPHONE ENCOUNTER
Medication PA Requested:  Teriparatide, Recombinant, (FORTEO) 600 MCG/2.4ML Subcutaneous Solution Pen-injector                                                        CoverMyMeds Used:  Key: [id number]   Quantity: 2.4 mL  Day Supply:  Sig: Inject 20 mcg into the skin daily for 28 days.   DX Code:  M81.8                                    CPT code (if applicable):   Case Number/Pending Ref#:  Thanks

## 2022-05-17 NOTE — TELEPHONE ENCOUNTER
Teriparatide, Recombinant, (FORTEO) 600 MCG/2.4ML Subcutaneous Solution Pen-injector, Inject 20 mcg into the skin daily for 28 days. , Disp: 2.4 mL, Rfl: 5    Key: [id number]

## 2022-05-18 LAB
BONE SPECIFIC ALKALINE PHOSPHATASE: 12.9 UG/L
C-TELOPEPTIDE, BETA-CROSS-LINK: 468 PG/ML

## 2022-05-18 NOTE — TELEPHONE ENCOUNTER
Patient is calling because [facility] notified her that the order was cancelled because prior auth was not done. Please call with an update.

## 2022-05-19 ENCOUNTER — PATIENT MESSAGE (OUTPATIENT)
Dept: ENDOCRINOLOGY CLINIC | Facility: CLINIC | Age: 67
End: 2022-05-19

## 2022-05-19 NOTE — TELEPHONE ENCOUNTER
Sent my chart to patient  The Prescription from Alysa [facility] is actually waiting prior authorization from insurance .  As soon as we receive response letter with approval Alejandrina Garcia will will inform you, very sorry for delay

## 2022-05-23 RX ORDER — ABALOPARATIDE 2000 UG/ML
80 INJECTION, SOLUTION SUBCUTANEOUS DAILY
Qty: 1.56 ML | Refills: 5 | Status: SHIPPED | OUTPATIENT
Start: 2022-05-23

## 2022-05-23 NOTE — TELEPHONE ENCOUNTER
Called [facility] [phone],  Per automated system [id number] was denied. Then spoke with Isac Morales who states needs to have t/f  Tymlos, and brand name Teraparatide (not Forteo) or letter stating why cannot use them. They both would also require a Prior Auth. Called again and spoke with [facility] who verified information above and agreed to refax letter with denial rationale to [phone].    Call ref # Alyssa Dock [id number]

## 2022-05-23 NOTE — TELEPHONE ENCOUNTER
Medication PA Requested:                 tymlos                                         CoverMyMeds Used:   Key:   Quantity: 1.56 ml  Day Supply: 28  Sig: inject 80 mcg into skin daily  DX Code:                                     CPT code (if applicable):   Case Number/Pending Ref#:

## 2022-05-24 RX ORDER — ABALOPARATIDE 2000 UG/ML
80 INJECTION, SOLUTION SUBCUTANEOUS DAILY
Qty: 1.56 ML | Refills: 5 | Status: SHIPPED | OUTPATIENT
Start: 2022-05-24

## 2022-05-24 NOTE — TELEPHONE ENCOUNTER
Rn called patient and explained we originally sent rx for Forteo and PA was denied by insurance as they prefer tymlos, patient verbalized understanding  Of change  We sent Tymlos rx  ,but by accident sent to [facility] , she would like to be sent to [facility] order less expensive  done

## 2022-05-24 NOTE — TELEPHONE ENCOUNTER
From: [place]  Sent: 5/24/2022 1:28 PM CDT  To: Keyla Moore Clinical Staff  Subject: Hubert prescription     Sorry to bother you again. Just got a message from [facility] regarding my tymlos prescription. Neda Schilder it was pending an insurance issue. I am confused. I thought the prescription was for [facility] and it was to be filled through [facility]. Can you clarify?

## 2022-05-26 ENCOUNTER — HOSPITAL ENCOUNTER (OUTPATIENT)
Dept: MAMMOGRAPHY | Age: 67
Discharge: HOME | End: 2022-05-26
Attending: FAMILY MEDICINE

## 2022-05-26 DIAGNOSIS — Z12.31 ENCOUNTER FOR SCREENING MAMMOGRAM FOR MALIGNANT NEOPLASM OF BREAST: ICD-10-CM

## 2022-05-26 PROCEDURE — 77063 BREAST TOMOSYNTHESIS BI: CPT

## 2022-06-03 ENCOUNTER — PATIENT MESSAGE (OUTPATIENT)
Dept: ENDOCRINOLOGY CLINIC | Facility: CLINIC | Age: 67
End: 2022-06-03

## 2022-06-03 DIAGNOSIS — E83.52 HYPERCALCEMIA: Primary | ICD-10-CM

## 2022-06-03 NOTE — TELEPHONE ENCOUNTER
Dr. Gurinder Adamson    Please advise on patient message. Advised to hold next dose until we hear from you.

## 2022-06-04 ENCOUNTER — LAB ENCOUNTER (OUTPATIENT)
Dept: LAB | Facility: HOSPITAL | Age: 67
End: 2022-06-04
Attending: INTERNAL MEDICINE
Payer: COMMERCIAL

## 2022-06-04 DIAGNOSIS — E83.52 HYPERCALCEMIA: ICD-10-CM

## 2022-06-04 LAB
ANION GAP SERPL CALC-SCNC: 5 MMOL/L (ref 0–18)
BUN BLD-MCNC: 12 MG/DL (ref 7–18)
CALCIUM BLD-MCNC: 9.7 MG/DL (ref 8.5–10.1)
CHLORIDE SERPL-SCNC: 103 MMOL/L (ref 98–112)
CO2 SERPL-SCNC: 30 MMOL/L (ref 21–32)
CREAT BLD-MCNC: 0.78 MG/DL
GLUCOSE BLD-MCNC: 95 MG/DL (ref 70–99)
OSMOLALITY SERPL CALC.SUM OF ELEC: 286 MOSM/KG (ref 275–295)
POTASSIUM SERPL-SCNC: 3.6 MMOL/L (ref 3.5–5.1)
SODIUM SERPL-SCNC: 138 MMOL/L (ref 136–145)

## 2022-06-04 PROCEDURE — 36415 COLL VENOUS BLD VENIPUNCTURE: CPT

## 2022-06-04 PROCEDURE — 80048 BASIC METABOLIC PNL TOTAL CA: CPT

## 2022-06-06 ENCOUNTER — TELEPHONE (OUTPATIENT)
Dept: ENDOCRINOLOGY CLINIC | Facility: CLINIC | Age: 67
End: 2022-06-06

## 2022-06-06 NOTE — TELEPHONE ENCOUNTER
Please call patient - good news, calcium level is normal on blood work. How is she feeling? Lets retry forteo but hold calcium supplementation, continue vitamin D and make sure she is drinking plenty of water. Thanks.

## 2022-06-06 NOTE — TELEPHONE ENCOUNTER
Dr. Tracey Half,  Please clarify - per patient's MC:  Started Tymlos injections last Saturday May 28. Patient should restart Tymlos, not forteo - correct?   Thanks

## 2022-06-06 NOTE — TELEPHONE ENCOUNTER
Dr. Jonny Rondon)  Spoke to patient to relay message below - patient stated understanding and will retry Tymlos  Patient stated she feels better since being off the Tymlos but not back to how she felt before starting Tymlos -patient states since starting the medication she feels like \"she'd rather die\" but is willing to try again for a few days  Patient stated she will call with update on Wednesday

## 2022-07-05 NOTE — TELEPHONE ENCOUNTER
Dr. Clifton Willson,     Please advise on below. Also, if patient is cleared to participate in the program she mentioned, we can just create a letter and release it via [employer]. Thank you. Sticks, Stones and Healthy Bones    Description: *Doctor clearance is needed prior to start of session. Come join our team of Exercise Physiologists on this 8-week journey as we build strength, increase bone mass density, improve posture, and make everyday living easier. This low impact program is designed not only to help individuals with Osteoporosis, Osteopenia, or any form of bone loss, but also as a preventative measure to making bones stronger.

## 2022-08-30 ENCOUNTER — OFFICE VISIT (OUTPATIENT)
Dept: FAMILY MEDICINE CLINIC | Facility: CLINIC | Age: 67
End: 2022-08-30
Payer: COMMERCIAL

## 2022-08-30 VITALS
HEART RATE: 95 BPM | SYSTOLIC BLOOD PRESSURE: 100 MMHG | BODY MASS INDEX: 22.57 KG/M2 | OXYGEN SATURATION: 100 % | DIASTOLIC BLOOD PRESSURE: 70 MMHG | HEIGHT: 63.5 IN | WEIGHT: 129 LBS

## 2022-08-30 DIAGNOSIS — E78.2 MIXED HYPERLIPIDEMIA: ICD-10-CM

## 2022-08-30 DIAGNOSIS — D21.21 FIBROMA OF RIGHT FOOT: ICD-10-CM

## 2022-08-30 DIAGNOSIS — F41.1 GENERALIZED ANXIETY DISORDER: ICD-10-CM

## 2022-08-30 DIAGNOSIS — R25.2 FOOT CRAMPS: ICD-10-CM

## 2022-08-30 DIAGNOSIS — K58.2 IRRITABLE BOWEL SYNDROME WITH BOTH CONSTIPATION AND DIARRHEA: ICD-10-CM

## 2022-08-30 DIAGNOSIS — Z00.00 ROUTINE MEDICAL EXAM: Primary | ICD-10-CM

## 2022-08-30 DIAGNOSIS — K63.5 BENIGN COLON POLYP: ICD-10-CM

## 2022-08-30 PROCEDURE — 99213 OFFICE O/P EST LOW 20 MIN: CPT | Performed by: FAMILY MEDICINE

## 2022-08-30 PROCEDURE — 3074F SYST BP LT 130 MM HG: CPT | Performed by: FAMILY MEDICINE

## 2022-08-30 PROCEDURE — 90715 TDAP VACCINE 7 YRS/> IM: CPT | Performed by: FAMILY MEDICINE

## 2022-08-30 PROCEDURE — 3008F BODY MASS INDEX DOCD: CPT | Performed by: FAMILY MEDICINE

## 2022-08-30 PROCEDURE — 3078F DIAST BP <80 MM HG: CPT | Performed by: FAMILY MEDICINE

## 2022-08-30 PROCEDURE — 99397 PER PM REEVAL EST PAT 65+ YR: CPT | Performed by: FAMILY MEDICINE

## 2022-08-30 PROCEDURE — 90471 IMMUNIZATION ADMIN: CPT | Performed by: FAMILY MEDICINE

## 2022-08-30 RX ORDER — DICYCLOMINE HYDROCHLORIDE 10 MG/1
10 CAPSULE ORAL
Qty: 30 CAPSULE | Refills: 1 | Status: SHIPPED | OUTPATIENT
Start: 2022-08-30

## 2022-08-30 RX ORDER — ROSUVASTATIN CALCIUM 5 MG/1
5 TABLET, COATED ORAL NIGHTLY
Qty: 90 TABLET | Refills: 2 | Status: SHIPPED | OUTPATIENT
Start: 2022-08-30

## 2022-09-08 DIAGNOSIS — E78.2 MIXED HYPERLIPIDEMIA: Primary | ICD-10-CM

## 2022-09-08 LAB
ABSOLUTE BASOPHILS: 47 CELLS/UL (ref 0–200)
ABSOLUTE EOSINOPHILS: 127 CELLS/UL (ref 15–500)
ABSOLUTE LYMPHOCYTES: 2338 CELLS/UL (ref 850–3900)
ABSOLUTE MONOCYTES: 469 CELLS/UL (ref 200–950)
ABSOLUTE NEUTROPHILS: 3719 CELLS/UL (ref 1500–7800)
ALBUMIN/GLOBULIN RATIO: 1.8 (CALC) (ref 1–2.5)
ALBUMIN: 4.4 G/DL (ref 3.6–5.1)
ALKALINE PHOSPHATASE: 83 U/L (ref 37–153)
ALT: 16 U/L (ref 6–29)
AST: 18 U/L (ref 10–35)
BASOPHILS: 0.7 %
BILIRUBIN, DIRECT: 0.1 MG/DL
BILIRUBIN, INDIRECT: 0.4 MG/DL (CALC) (ref 0.2–1.2)
BILIRUBIN, TOTAL: 0.5 MG/DL (ref 0.2–1.2)
CHOL/HDLC RATIO: 2.4 (CALC)
CHOLESTEROL, TOTAL: 156 MG/DL
EOSINOPHILS: 1.9 %
GLOBULIN: 2.5 G/DL (CALC) (ref 1.9–3.7)
HDL CHOLESTEROL: 66 MG/DL
HEMATOCRIT: 42.6 % (ref 35–45)
HEMOGLOBIN: 14 G/DL (ref 11.7–15.5)
LDL-CHOLESTEROL: 71 MG/DL (CALC)
LYMPHOCYTES: 34.9 %
MCH: 29.7 PG (ref 27–33)
MCHC: 32.9 G/DL (ref 32–36)
MCV: 90.3 FL (ref 80–100)
MONOCYTES: 7 %
MPV: 12.7 FL (ref 7.5–12.5)
NEUTROPHILS: 55.5 %
NON-HDL CHOLESTEROL: 90 MG/DL (CALC)
PLATELET COUNT: 243 THOUSAND/UL (ref 140–400)
PROTEIN, TOTAL: 6.9 G/DL (ref 6.1–8.1)
RDW: 11.8 % (ref 11–15)
RED BLOOD CELL COUNT: 4.72 MILLION/UL (ref 3.8–5.1)
TRIGLYCERIDES: 107 MG/DL
TSH: 1.58 MIU/L (ref 0.4–4.5)
WHITE BLOOD CELL COUNT: 6.7 THOUSAND/UL (ref 3.8–10.8)

## 2022-09-09 NOTE — PROGRESS NOTES
Neto Santos 41,    Attached are the results of your recently performed labs/tests: All results are essentially stable or within NORMAL limits. Please: Continue your present medication(s). Recheck as below:   The lipids in 6 months  Follow up:  Pending the results     Take care,     Talya Garcia MD  9/8/2022    (Report(s) are attached, future lab/test orders or any referrals are in your chart/MyChart or will be mailed to you)

## 2022-11-03 ENCOUNTER — OFFICE VISIT (OUTPATIENT)
Dept: ENDOCRINOLOGY CLINIC | Facility: CLINIC | Age: 67
End: 2022-11-03
Payer: COMMERCIAL

## 2022-11-03 VITALS
BODY MASS INDEX: 23 KG/M2 | HEART RATE: 94 BPM | SYSTOLIC BLOOD PRESSURE: 124 MMHG | DIASTOLIC BLOOD PRESSURE: 78 MMHG | WEIGHT: 130 LBS

## 2022-11-03 DIAGNOSIS — M81.0 AGE-RELATED OSTEOPOROSIS WITHOUT CURRENT PATHOLOGICAL FRACTURE: Primary | ICD-10-CM

## 2022-11-03 DIAGNOSIS — E55.9 VITAMIN D DEFICIENCY: ICD-10-CM

## 2022-11-03 PROCEDURE — 99213 OFFICE O/P EST LOW 20 MIN: CPT | Performed by: INTERNAL MEDICINE

## 2022-11-03 PROCEDURE — 3074F SYST BP LT 130 MM HG: CPT | Performed by: INTERNAL MEDICINE

## 2022-11-03 PROCEDURE — 3078F DIAST BP <80 MM HG: CPT | Performed by: INTERNAL MEDICINE

## 2022-11-03 RX ORDER — ABALOPARATIDE 2000 UG/ML
80 INJECTION, SOLUTION SUBCUTANEOUS DAILY
Qty: 1.56 ML | Refills: 1 | Status: SHIPPED | OUTPATIENT
Start: 2022-11-03

## 2022-11-09 LAB
ALKALINE PHOSPHATASE, BONE SPECIFIC: 20.2 MCG/L (ref 5.6–29)
BUN: 12 MG/DL (ref 7–25)
CALCIUM: 10.1 MG/DL (ref 8.6–10.4)
CARBON DIOXIDE: 27 MMOL/L (ref 20–32)
CHLORIDE: 102 MMOL/L (ref 98–110)
CREATININE: 0.73 MG/DL (ref 0.5–1.05)
EGFR: 90 ML/MIN/1.73M2
GLUCOSE: 93 MG/DL (ref 65–99)
PARATHYROID HORMONE,$INTACT: 18 PG/ML (ref 16–77)
POTASSIUM: 4.5 MMOL/L (ref 3.5–5.3)
SODIUM: 140 MMOL/L (ref 135–146)
VITAMIN D, 25-OH, TOTAL: 43 NG/ML (ref 30–100)

## 2022-12-22 ENCOUNTER — PATIENT MESSAGE (OUTPATIENT)
Dept: ENDOCRINOLOGY CLINIC | Facility: CLINIC | Age: 67
End: 2022-12-22

## 2022-12-22 RX ORDER — PEN NEEDLE, DIABETIC 32GX 5/32"
NEEDLE, DISPOSABLE MISCELLANEOUS
Qty: 100 EACH | Refills: 1 | Status: SHIPPED | OUTPATIENT
Start: 2022-12-22

## 2022-12-22 RX ORDER — ABALOPARATIDE 2000 UG/ML
80 INJECTION, SOLUTION SUBCUTANEOUS DAILY
Qty: 1.56 ML | Refills: 1 | Status: SHIPPED | OUTPATIENT
Start: 2022-12-22

## 2022-12-22 NOTE — TELEPHONE ENCOUNTER
From: [place]  To: Mya Khoury MD  Sent: 12/22/2022 11:12 AM CST  Subject: Change in insurance. Need prescription     Talked to Dr Kezia Scott during my November visit about upcoming insurance change. She said to contact the office in December to make the change regarding my Tymlos prescription. Moving forward we still have [employer] but the mail order/specialty drug provider will be [facility].  Please forward a prescription for Tymlos and the pen needles to them     Thank you  Have a great holiday   Brendaia

## 2023-01-16 ENCOUNTER — PATIENT MESSAGE (OUTPATIENT)
Dept: ENDOCRINOLOGY CLINIC | Facility: CLINIC | Age: 68
End: 2023-01-16

## 2023-01-16 RX ORDER — ABALOPARATIDE 2000 UG/ML
80 INJECTION, SOLUTION SUBCUTANEOUS DAILY
Qty: 1.56 ML | Refills: 2 | Status: SHIPPED | OUTPATIENT
Start: 2023-01-16

## 2023-01-16 RX ORDER — ABALOPARATIDE 2000 UG/ML
INJECTION, SOLUTION SUBCUTANEOUS
Qty: 1.56 ML | Refills: 1 | Status: SHIPPED | OUTPATIENT
Start: 2023-01-16 | End: 2023-01-16

## 2023-01-16 NOTE — TELEPHONE ENCOUNTER
LOV:11/03/22    RTC: 6months    F/U:05/25/23     Pending Monthly Supply:orders pending, please approve if appropriate.

## 2023-01-16 NOTE — TELEPHONE ENCOUNTER
From: [place]  To: Alexia Miller MD  Sent: 12/22/2022 11:12 AM CST  Subject: Change in insurance. Need prescription     Talked to Dr Lisa Schaefer during my November visit about upcoming insurance change. She said to contact the office in December to make the change regarding my Tymlos prescription. Moving forward we still have [employer] but the mail order/specialty drug provider will be [facility].  Please forward a prescription for Tymlos and the pen needles to them     Thank you  Have a great holiday   [facility]

## 2023-05-09 ENCOUNTER — OFFICE VISIT (OUTPATIENT)
Facility: CLINIC | Age: 68
End: 2023-05-09

## 2023-05-09 VITALS
WEIGHT: 131 LBS | BODY MASS INDEX: 22.92 KG/M2 | RESPIRATION RATE: 20 BRPM | DIASTOLIC BLOOD PRESSURE: 84 MMHG | OXYGEN SATURATION: 99 % | HEART RATE: 91 BPM | TEMPERATURE: 97 F | SYSTOLIC BLOOD PRESSURE: 126 MMHG | HEIGHT: 63.5 IN

## 2023-05-09 DIAGNOSIS — M81.0 AGE-RELATED OSTEOPOROSIS WITHOUT CURRENT PATHOLOGICAL FRACTURE: Chronic | ICD-10-CM

## 2023-05-09 DIAGNOSIS — K58.0 IRRITABLE BOWEL SYNDROME WITH DIARRHEA: ICD-10-CM

## 2023-05-09 DIAGNOSIS — Z80.9 FAMILY HISTORY OF CANCER: ICD-10-CM

## 2023-05-09 DIAGNOSIS — R15.9 INCONTINENCE OF FECES, UNSPECIFIED FECAL INCONTINENCE TYPE: ICD-10-CM

## 2023-05-09 DIAGNOSIS — G43.109 MIGRAINE WITH AURA AND WITHOUT STATUS MIGRAINOSUS, NOT INTRACTABLE: ICD-10-CM

## 2023-05-09 DIAGNOSIS — Z12.31 BREAST CANCER SCREENING BY MAMMOGRAM: ICD-10-CM

## 2023-05-09 DIAGNOSIS — Z00.00 ROUTINE HEALTH MAINTENANCE: Primary | ICD-10-CM

## 2023-05-09 PROCEDURE — 90471 IMMUNIZATION ADMIN: CPT | Performed by: INTERNAL MEDICINE

## 2023-05-09 PROCEDURE — 99387 INIT PM E/M NEW PAT 65+ YRS: CPT | Performed by: INTERNAL MEDICINE

## 2023-05-09 PROCEDURE — 3008F BODY MASS INDEX DOCD: CPT | Performed by: INTERNAL MEDICINE

## 2023-05-09 PROCEDURE — 90677 PCV20 VACCINE IM: CPT | Performed by: INTERNAL MEDICINE

## 2023-05-09 PROCEDURE — 99203 OFFICE O/P NEW LOW 30 MIN: CPT | Performed by: INTERNAL MEDICINE

## 2023-05-09 PROCEDURE — 3079F DIAST BP 80-89 MM HG: CPT | Performed by: INTERNAL MEDICINE

## 2023-05-09 PROCEDURE — 3074F SYST BP LT 130 MM HG: CPT | Performed by: INTERNAL MEDICINE

## 2023-05-09 NOTE — PATIENT INSTRUCTIONS
To schedule physical therapy at any of the [facility] facilities, please call [phone].

## 2023-05-09 NOTE — ASSESSMENT & PLAN NOTE
Patient recently had a migraine after not having had one for many years.   She will monitor her symptoms and call back if she has recurrent migraines

## 2023-05-10 NOTE — ASSESSMENT & PLAN NOTE
Patient has 3 siblings and they have all had cancer 1 had colon cancer and prostate cancer, another had prostate cancer and a sister had breast cancer. Refer for genetic counseling.

## 2023-05-10 NOTE — ASSESSMENT & PLAN NOTE
Reviewed Dr. Fazal Lozano notes and labs. Patient is not tolerating the Tymloc. She will follow-up with her.

## 2023-05-11 ENCOUNTER — TELEPHONE (OUTPATIENT)
Dept: GENETICS | Facility: HOSPITAL | Age: 68
End: 2023-05-11

## 2023-05-11 NOTE — TELEPHONE ENCOUNTER
LVM: I spoke with [place] she is going to speak with her insurance and then call us back. Will follow up.  Thanks [employer]

## 2023-05-25 ENCOUNTER — OFFICE VISIT (OUTPATIENT)
Dept: ENDOCRINOLOGY CLINIC | Facility: CLINIC | Age: 68
End: 2023-05-25

## 2023-05-25 VITALS
DIASTOLIC BLOOD PRESSURE: 75 MMHG | BODY MASS INDEX: 23 KG/M2 | HEART RATE: 93 BPM | WEIGHT: 133 LBS | SYSTOLIC BLOOD PRESSURE: 115 MMHG

## 2023-05-25 DIAGNOSIS — E55.9 VITAMIN D DEFICIENCY: ICD-10-CM

## 2023-05-25 DIAGNOSIS — M81.0 AGE-RELATED OSTEOPOROSIS WITHOUT CURRENT PATHOLOGICAL FRACTURE: Primary | ICD-10-CM

## 2023-05-25 PROCEDURE — 99214 OFFICE O/P EST MOD 30 MIN: CPT | Performed by: INTERNAL MEDICINE

## 2023-05-25 PROCEDURE — 96372 THER/PROPH/DIAG INJ SC/IM: CPT | Performed by: INTERNAL MEDICINE

## 2023-05-25 PROCEDURE — 3078F DIAST BP <80 MM HG: CPT | Performed by: INTERNAL MEDICINE

## 2023-05-25 PROCEDURE — 3074F SYST BP LT 130 MM HG: CPT | Performed by: INTERNAL MEDICINE

## 2023-05-31 ENCOUNTER — TELEPHONE (OUTPATIENT)
Dept: ENDOCRINOLOGY CLINIC | Facility: CLINIC | Age: 68
End: 2023-05-31

## 2023-05-31 NOTE — TELEPHONE ENCOUNTER
juliet allen patient , she had Prolia injection #1 5/25/23  Asking for grisel for prolia #2 in November.   Booked 11/27 with RN

## 2023-06-04 LAB
ABSOLUTE BASOPHILS: 38 CELLS/UL (ref 0–200)
ABSOLUTE EOSINOPHILS: 92 CELLS/UL (ref 15–500)
ABSOLUTE LYMPHOCYTES: 1555 CELLS/UL (ref 850–3900)
ABSOLUTE MONOCYTES: 432 CELLS/UL (ref 200–950)
ABSOLUTE NEUTROPHILS: 3283 CELLS/UL (ref 1500–7800)
ALBUMIN/GLOBULIN RATIO: 1.8 (CALC) (ref 1–2.5)
ALBUMIN: 4.4 G/DL (ref 3.6–5.1)
ALKALINE PHOSPHATASE: 98 U/L (ref 37–153)
ALT: 22 U/L (ref 6–29)
AST: 21 U/L (ref 10–35)
BASOPHILS: 0.7 %
BILIRUBIN, TOTAL: 0.5 MG/DL (ref 0.2–1.2)
BUN: 17 MG/DL (ref 7–25)
CALCIUM: 9.2 MG/DL (ref 8.6–10.4)
CARBON DIOXIDE: 29 MMOL/L (ref 20–32)
CHLORIDE: 106 MMOL/L (ref 98–110)
CHOL/HDLC RATIO: 2.3 (CALC)
CHOLESTEROL, TOTAL: 147 MG/DL
CREATININE: 0.71 MG/DL (ref 0.5–1.05)
EGFR: 93 ML/MIN/1.73M2
EOSINOPHILS: 1.7 %
GLOBULIN: 2.5 G/DL (CALC) (ref 1.9–3.7)
GLUCOSE: 89 MG/DL (ref 65–99)
HDL CHOLESTEROL: 63 MG/DL
HEMATOCRIT: 42.9 % (ref 35–45)
HEMOGLOBIN: 13.9 G/DL (ref 11.7–15.5)
LDL-CHOLESTEROL: 67 MG/DL (CALC)
LYMPHOCYTES: 28.8 %
MCH: 29.8 PG (ref 27–33)
MCHC: 32.4 G/DL (ref 32–36)
MCV: 92.1 FL (ref 80–100)
MONOCYTES: 8 %
MPV: 13 FL (ref 7.5–12.5)
NEUTROPHILS: 60.8 %
NON-HDL CHOLESTEROL: 84 MG/DL (CALC)
PLATELET COUNT: 231 THOUSAND/UL (ref 140–400)
POTASSIUM: 4.8 MMOL/L (ref 3.5–5.3)
PROTEIN, TOTAL: 6.9 G/DL (ref 6.1–8.1)
RDW: 12.3 % (ref 11–15)
RED BLOOD CELL COUNT: 4.66 MILLION/UL (ref 3.8–5.1)
SODIUM: 140 MMOL/L (ref 135–146)
TRIGLYCERIDES: 86 MG/DL
TSH W/REFLEX TO FT4: 2.15 MIU/L (ref 0.4–4.5)
WHITE BLOOD CELL COUNT: 5.4 THOUSAND/UL (ref 3.8–10.8)

## 2023-06-07 ENCOUNTER — PATIENT MESSAGE (OUTPATIENT)
Dept: ENDOCRINOLOGY CLINIC | Facility: CLINIC | Age: 68
End: 2023-06-07

## 2023-06-07 DIAGNOSIS — Z12.31 ENCOUNTER FOR SCREENING MAMMOGRAM FOR MALIGNANT NEOPLASM OF BREAST: Primary | ICD-10-CM

## 2023-06-07 LAB
ALKALINE PHOSPHATASE, BONE SPECIFIC: 21.9 MCG/L (ref 5.6–29)
PARATHYROID HORMONE,$INTACT: 75 PG/ML (ref 16–77)
VITAMIN D, 25-OH, TOTAL: 40 NG/ML (ref 30–100)

## 2023-06-07 NOTE — TELEPHONE ENCOUNTER
Labs ordered by Dr. Kezia Scott under [facility] not completed: Vitmain D, PTH, Alk Phos (bone specific). MyChart sent.

## 2023-06-07 NOTE — TELEPHONE ENCOUNTER
From: [facility]  To: Vijaya Navarrete MD  Sent: 6/7/2023 12:15 PM CDT  Subject: Blood work     I went to [facility] in [facility] for blood work with orders from Dr MARLENI Benz and Dr Marlen Tavares. I see results posted on My Chart from Dr Marlen Tavares. Did your office receive results? If not I can try to follow up with [employer]. Thanks!   PS. I had the blood draw on Saturday Afshan 3

## 2023-06-08 NOTE — TELEPHONE ENCOUNTER
Received fax from [facility] attached is pt lab results collected on 06/03/23 results placed in provider folder for review.

## 2023-06-12 ENCOUNTER — HOSPITAL ENCOUNTER (OUTPATIENT)
Dept: CT IMAGING | Age: 68
Discharge: HOME | End: 2023-06-12
Attending: INTERNAL MEDICINE

## 2023-06-12 DIAGNOSIS — Z12.31 ENCOUNTER FOR SCREENING MAMMOGRAM FOR MALIGNANT NEOPLASM OF BREAST: ICD-10-CM

## 2023-06-12 PROCEDURE — 77063 BREAST TOMOSYNTHESIS BI: CPT

## 2023-06-13 ENCOUNTER — TELEPHONE (OUTPATIENT)
Facility: CLINIC | Age: 68
End: 2023-06-13

## 2023-06-14 ENCOUNTER — TELEPHONE (OUTPATIENT)
Dept: CT IMAGING | Age: 68
End: 2023-06-14

## 2023-06-15 ENCOUNTER — PATIENT MESSAGE (OUTPATIENT)
Facility: CLINIC | Age: 68
End: 2023-06-15

## 2023-06-15 ENCOUNTER — TELEPHONE (OUTPATIENT)
Dept: INTERNAL MEDICINE CLINIC | Facility: CLINIC | Age: 68
End: 2023-06-15

## 2023-06-15 NOTE — TELEPHONE ENCOUNTER
Pt called us as the place still has not received the order. Inform pt to give it more time as it was faxed a couple min ago. I called [phone] and spoke to Mani Napier can she confirmed that she has received the order. Pt will get a call once the person who does the appt returns from lunch. Pt was called and made aware of this.

## 2023-06-15 NOTE — TELEPHONE ENCOUNTER
Spoke to patient and told informed her that her diagnostic left breast mammogram order was faxed to [phone]

## 2023-06-15 NOTE — TELEPHONE ENCOUNTER
Called requesting for an  Order to be signed and placed by provider for a left breast diagnostic and ultrasound.  She was requesting for it to signed today if possible  Fax# [phone]

## 2023-06-20 ENCOUNTER — HOSPITAL ENCOUNTER (OUTPATIENT)
Dept: CT IMAGING | Age: 68
Discharge: HOME | End: 2023-06-20
Attending: INTERNAL MEDICINE

## 2023-06-20 DIAGNOSIS — N64.89 BREAST ASYMMETRY: ICD-10-CM

## 2023-06-20 DIAGNOSIS — R92.8 ABNORMAL MAMMOGRAM: ICD-10-CM

## 2023-06-20 DIAGNOSIS — R93.89 ABNORMAL ULTRASOUND: ICD-10-CM

## 2023-06-20 PROCEDURE — 76642 ULTRASOUND BREAST LIMITED: CPT

## 2023-06-20 PROCEDURE — G0279 TOMOSYNTHESIS, MAMMO: HCPCS

## 2023-07-31 ENCOUNTER — OFFICE VISIT (OUTPATIENT)
Facility: CLINIC | Age: 68
End: 2023-07-31

## 2023-07-31 VITALS
RESPIRATION RATE: 18 BRPM | HEART RATE: 70 BPM | DIASTOLIC BLOOD PRESSURE: 70 MMHG | WEIGHT: 129 LBS | SYSTOLIC BLOOD PRESSURE: 126 MMHG | HEIGHT: 63.5 IN | BODY MASS INDEX: 22.57 KG/M2 | OXYGEN SATURATION: 99 %

## 2023-07-31 DIAGNOSIS — M81.0 AGE-RELATED OSTEOPOROSIS WITHOUT CURRENT PATHOLOGICAL FRACTURE: Chronic | ICD-10-CM

## 2023-07-31 DIAGNOSIS — Z80.3 FAMILY HISTORY OF BREAST CANCER: ICD-10-CM

## 2023-07-31 DIAGNOSIS — C44.311 BASAL CELL CARCINOMA (BCC) OF SKIN OF NOSE: ICD-10-CM

## 2023-07-31 DIAGNOSIS — E78.2 MIXED HYPERLIPIDEMIA: ICD-10-CM

## 2023-07-31 DIAGNOSIS — R92.8 ABNORMAL MAMMOGRAM: Primary | ICD-10-CM

## 2023-07-31 PROCEDURE — 3078F DIAST BP <80 MM HG: CPT | Performed by: INTERNAL MEDICINE

## 2023-07-31 PROCEDURE — 3074F SYST BP LT 130 MM HG: CPT | Performed by: INTERNAL MEDICINE

## 2023-07-31 PROCEDURE — 99214 OFFICE O/P EST MOD 30 MIN: CPT | Performed by: INTERNAL MEDICINE

## 2023-07-31 PROCEDURE — 3008F BODY MASS INDEX DOCD: CPT | Performed by: INTERNAL MEDICINE

## 2023-07-31 RX ORDER — ROSUVASTATIN CALCIUM 5 MG/1
5 TABLET, COATED ORAL NIGHTLY
Qty: 90 TABLET | Refills: 3 | Status: SHIPPED | OUTPATIENT
Start: 2023-07-31

## 2023-07-31 NOTE — ASSESSMENT & PLAN NOTE
Reviewed mammogram and ultrasound report with patient. These were done at [facility].  6-month follow-up is recommended, however patient is concerned with her family's history and previous biopsy that she should have additional testing. We will refer her to breast surgeon.

## 2023-09-07 ENCOUNTER — HOSPITAL ENCOUNTER (OUTPATIENT)
Dept: BONE DENSITY | Age: 68
Discharge: HOME | End: 2023-09-07
Attending: INTERNAL MEDICINE
Payer: COMMERCIAL

## 2023-09-07 DIAGNOSIS — M81.0 AGE-RELATED OSTEOPOROSIS WITHOUT CURRENT PATHOLOGICAL FRACTURE: ICD-10-CM

## 2023-09-07 PROCEDURE — 77080 DXA BONE DENSITY AXIAL: CPT | Performed by: INTERNAL MEDICINE

## 2023-09-28 ENCOUNTER — PATIENT OUTREACH (OUTPATIENT)
Dept: CASE MANAGEMENT | Age: 68
End: 2023-09-28

## 2023-09-28 NOTE — PROCEDURES
The office order for PCP removal request is Approved and finalized on September 28, 2023.     Thanks,  [facility] [employer]

## 2023-10-16 ENCOUNTER — HOSPITAL ENCOUNTER (OUTPATIENT)
Dept: MAMMOGRAPHY | Facility: HOSPITAL | Age: 68
Discharge: HOME | End: 2023-10-16
Attending: INTERNAL MEDICINE
Payer: COMMERCIAL

## 2023-10-16 DIAGNOSIS — R92.8 ABNORMAL MAMMOGRAM: ICD-10-CM

## 2023-10-16 PROCEDURE — 77061 BREAST TOMOSYNTHESIS UNI: CPT | Performed by: INTERNAL MEDICINE

## 2023-10-16 PROCEDURE — 76642 ULTRASOUND BREAST LIMITED: CPT | Performed by: INTERNAL MEDICINE

## 2023-10-16 PROCEDURE — 77065 DX MAMMO INCL CAD UNI: CPT | Performed by: INTERNAL MEDICINE

## 2023-10-26 ENCOUNTER — TELEPHONE (OUTPATIENT)
Dept: ENDOCRINOLOGY CLINIC | Facility: CLINIC | Age: 68
End: 2023-10-26

## 2023-10-26 NOTE — TELEPHONE ENCOUNTER
Last Prolia injection 05/25/23. Patient will be due for next shot 11/25/23. Submitted IV to [facility] portal, waiting for SOB.

## 2023-11-02 NOTE — TELEPHONE ENCOUNTER
SOB RECEIVED VIA FAX DATED ON 10/30/23    NO PA REQUIRED     OOPCOST;0%    FACILITY FEE;NA    ADMIN FEE;0%  Pt has appt scheduled

## 2023-11-06 ENCOUNTER — OFFICE VISIT (OUTPATIENT)
Dept: INTERNAL MEDICINE CLINIC | Facility: CLINIC | Age: 68
End: 2023-11-06

## 2023-11-06 VITALS
SYSTOLIC BLOOD PRESSURE: 136 MMHG | WEIGHT: 129 LBS | RESPIRATION RATE: 18 BRPM | TEMPERATURE: 98 F | HEART RATE: 87 BPM | OXYGEN SATURATION: 100 % | BODY MASS INDEX: 22.57 KG/M2 | HEIGHT: 63.5 IN | DIASTOLIC BLOOD PRESSURE: 74 MMHG

## 2023-11-06 DIAGNOSIS — I95.9 TRANSIENT HYPOTENSION: ICD-10-CM

## 2023-11-06 DIAGNOSIS — R00.2 PALPITATIONS: ICD-10-CM

## 2023-11-06 DIAGNOSIS — R51.9 ACUTE NONINTRACTABLE HEADACHE, UNSPECIFIED HEADACHE TYPE: ICD-10-CM

## 2023-11-06 DIAGNOSIS — H53.9 VISION CHANGES: ICD-10-CM

## 2023-11-06 DIAGNOSIS — R55 PRE-SYNCOPE: Primary | ICD-10-CM

## 2023-11-06 LAB
ALBUMIN SERPL-MCNC: 4.6 G/DL (ref 3.2–4.8)
ALBUMIN/GLOB SERPL: 1.6 {RATIO} (ref 1–2)
ALP LIVER SERPL-CCNC: 55 U/L
ALT SERPL-CCNC: 24 U/L
ANION GAP SERPL CALC-SCNC: 6 MMOL/L (ref 0–18)
AST SERPL-CCNC: 23 U/L (ref ?–34)
BASOPHILS # BLD AUTO: 0.04 X10(3) UL (ref 0–0.2)
BASOPHILS NFR BLD AUTO: 0.5 %
BILIRUB SERPL-MCNC: 0.4 MG/DL (ref 0.2–1.1)
BUN BLD-MCNC: 11 MG/DL (ref 9–23)
BUN/CREAT SERPL: 13.8 (ref 10–20)
CALCIUM BLD-MCNC: 10 MG/DL (ref 8.7–10.4)
CHLORIDE SERPL-SCNC: 109 MMOL/L (ref 98–112)
CO2 SERPL-SCNC: 26 MMOL/L (ref 21–32)
CREAT BLD-MCNC: 0.8 MG/DL
DEPRECATED RDW RBC AUTO: 39.7 FL (ref 35.1–46.3)
EGFRCR SERPLBLD CKD-EPI 2021: 80 ML/MIN/1.73M2 (ref 60–?)
EOSINOPHIL # BLD AUTO: 0.05 X10(3) UL (ref 0–0.7)
EOSINOPHIL NFR BLD AUTO: 0.6 %
ERYTHROCYTE [DISTWIDTH] IN BLOOD BY AUTOMATED COUNT: 11.9 % (ref 11–15)
FASTING STATUS PATIENT QL REPORTED: YES
GLOBULIN PLAS-MCNC: 2.9 G/DL (ref 2.8–4.4)
GLUCOSE BLD-MCNC: 109 MG/DL (ref 70–99)
HCT VFR BLD AUTO: 44.8 %
HGB BLD-MCNC: 14.5 G/DL
IMM GRANULOCYTES # BLD AUTO: 0.02 X10(3) UL (ref 0–1)
IMM GRANULOCYTES NFR BLD: 0.2 %
LYMPHOCYTES # BLD AUTO: 2.29 X10(3) UL (ref 1–4)
LYMPHOCYTES NFR BLD AUTO: 28.6 %
MCH RBC QN AUTO: 29.5 PG (ref 26–34)
MCHC RBC AUTO-ENTMCNC: 32.4 G/DL (ref 31–37)
MCV RBC AUTO: 91.1 FL
MONOCYTES # BLD AUTO: 0.57 X10(3) UL (ref 0.1–1)
MONOCYTES NFR BLD AUTO: 7.1 %
NEUTROPHILS # BLD AUTO: 5.05 X10 (3) UL (ref 1.5–7.7)
NEUTROPHILS # BLD AUTO: 5.05 X10(3) UL (ref 1.5–7.7)
NEUTROPHILS NFR BLD AUTO: 63 %
OSMOLALITY SERPL CALC.SUM OF ELEC: 292 MOSM/KG (ref 275–295)
PLATELET # BLD AUTO: 291 10(3)UL (ref 150–450)
POTASSIUM SERPL-SCNC: 4.7 MMOL/L (ref 3.5–5.1)
PROT SERPL-MCNC: 7.5 G/DL (ref 5.7–8.2)
RBC # BLD AUTO: 4.92 X10(6)UL
SODIUM SERPL-SCNC: 141 MMOL/L (ref 136–145)
WBC # BLD AUTO: 8 X10(3) UL (ref 4–11)

## 2023-11-06 PROCEDURE — 3008F BODY MASS INDEX DOCD: CPT | Performed by: INTERNAL MEDICINE

## 2023-11-06 PROCEDURE — 99214 OFFICE O/P EST MOD 30 MIN: CPT | Performed by: INTERNAL MEDICINE

## 2023-11-06 PROCEDURE — 3078F DIAST BP <80 MM HG: CPT | Performed by: INTERNAL MEDICINE

## 2023-11-06 PROCEDURE — 36415 COLL VENOUS BLD VENIPUNCTURE: CPT | Performed by: INTERNAL MEDICINE

## 2023-11-06 PROCEDURE — 3075F SYST BP GE 130 - 139MM HG: CPT | Performed by: INTERNAL MEDICINE

## 2023-11-06 NOTE — PROGRESS NOTES
Subjective:     Patient ID: Rosio Dimas is a 76year old female. HPI    Pt comes in today with follow up after she had a pre syncopal episode yesterday at a rehab facility while visiting a friend. She was not doing anything in particular just started feeling hot and wanted to step out of the door to take a breather and felt light headed, tunnel vision and told her  I am about to fall who cough her and put her on the chair, she is nto sure if she had any palpitations, did not loose conscious but almost did she did not hit her head, paramedics were called and her initial bp was very low   at 69/47 but then the bp recovered back to normal with out needing any IVF. EKG was done by paramedics and was NSR. Pt work up this morning with a headache, her bp at home was on the higher side 138/92 and she always runs on the nl side. She has never had any similar symptoms before. History/Other:   Review of Systems   Constitutional: Negative. HENT: Negative. Eyes:  Positive for visual disturbance. Respiratory: Negative. Negative for chest tightness and shortness of breath. Cardiovascular:  Positive for palpitations. Negative for chest pain. Gastrointestinal: Negative. Genitourinary: Negative. Musculoskeletal: Negative. Skin: Negative. Neurological:  Positive for dizziness and headaches. Psychiatric/Behavioral: Negative. Current Outpatient Medications   Medication Sig Dispense Refill    Denosumab (PROLIA SC)       rosuvastatin 5 MG Oral Tab Take 1 tablet (5 mg total) by mouth nightly. 90 tablet 3    Calcium Carbonate 600 MG Oral Tab Take 1 tablet (600 mg total) by mouth. Cholecalciferol (VITAMIN D) 50 MCG (2000 UT) Oral Tab Take by mouth daily. Olopatadine HCl 0.1 % Ophthalmic Solution Place 1 drop into both eyes 2 (two) times daily. PRN 5 mL 3    Azelaic Acid 15 % External Gel Apply topically 2 (two) times daily.        Allergies:  Compazine [Prochlor*    OTHER (SEE COMMENTS)    Comment:Extrpyramidal - uncontrolled facial movement    Past Medical History:   Diagnosis Date    Fibromyalgia     Irritable bowel syndrome     PONV (postoperative nausea and vomiting)       Past Surgical History:   Procedure Laterality Date    ARTHROSCOPY, SHOULDER, SURGI Left     BREAST BIOPSY Left 01/01/1991    BSO, OMENTECTOMY W/MEAGAN      Age 37    COLONOSCOPY N/A 08/06/2015    Procedure: COLONOSCOPY;  Surgeon: Heather Xie MD;  Location: [facility] ENDOSCOPY    HYSTERECTOMY      IR CENTRAL LINE (TLC) PLACEMENT  01/01/1989    gtoerenesus    CRISSY LOCALIZATION WIRE 1 SITE LEFT (CPT=19281)        Family History   Problem Relation Age of Onset    Dementia Mother     Depression Mother     Other (Osteoporosis) Mother     Stroke Father     Other (Atrial fibrilation) Father     Other (Hyperlipidemia) Father     Breast Cancer Sister 66    Prostate Cancer Brother     Dementia Maternal Grandmother     Other (Osteoporosis) Maternal Grandmother     Heart Disease Maternal Grandfather     Asthma Paternal Grandmother     Other (Osteoporosis) Paternal Grandmother     Other (Allergic rhinitis) Paternal Grandmother     Heart Disease Paternal Grandfather     Ovarian Cancer Maternal Aunt 61    Other (Hyperlipidemia) Other         Siblings    Other (Allergic rhinitis) Other         Siblings    Depression Other         Children    Other (ADD) Other         Children    Other (Lung cancer) Other         Aunts, uncles    Stroke Other         Aunts    Heart Disease Other         Uncles    Dementia Other         Uncles      Social History:   Social History     Socioeconomic History    Marital status:    Tobacco Use    Smoking status: Never    Smokeless tobacco: Never   Vaping Use    Vaping Use: Never used   Substance and Sexual Activity    Alcohol use: Not Currently     Alcohol/week: 1.0 standard drink of alcohol     Types: 1 Standard drinks or equivalent per week     Comment: occasional    Drug use: No    Sexual activity: Yes     Partners: Male     Birth control/protection: Hysterectomy        Objective:   Physical Exam  Vitals and nursing note reviewed. Constitutional:       Appearance: She is well-developed. HENT:      Head: Normocephalic and atraumatic. Right Ear: External ear normal.      Left Ear: External ear normal.      Nose: Nose normal.   Eyes:      Conjunctiva/sclera: Conjunctivae normal.      Pupils: Pupils are equal, round, and reactive to light. Cardiovascular:      Rate and Rhythm: Normal rate and regular rhythm. Heart sounds: Normal heart sounds. Pulmonary:      Effort: Pulmonary effort is normal.      Breath sounds: Normal breath sounds. Abdominal:      General: Bowel sounds are normal.      Palpations: Abdomen is soft. Genitourinary:     Vagina: Normal.   Musculoskeletal:         General: Normal range of motion. Cervical back: Normal range of motion and neck supple. Skin:     General: Skin is warm and dry. Neurological:      Mental Status: She is alert and oriented to person, place, and time. Deep Tendon Reflexes: Reflexes are normal and symmetric. Psychiatric:         Behavior: Behavior normal.         Thought Content: Thought content normal.         Judgment: Judgment normal.         Assessment & Plan:   Pre-syncope  (primary encounter diagnosis)?  Vaso vagal vs, arrhythmia or neuro logical   Will order Holter, echo, carotid doppler, and MRI oif head, also ref to see cards  Any similar symptoms needs to go to ER  Acute nonintractable headache, unspecified headache type- as above, as need med for pain like tylenol   Palpitations- as above will follow work up   Transient hypotension- make sure to drink enough fluids   Vision changes- as above     Orders Placed This Encounter      CBC With Differential With Platelet      Comp Metabolic Panel (14)      Meds This Visit:  Requested Prescriptions      No prescriptions requested or ordered in this encounter       Imaging & Referrals:  CARDIO - INTERNAL  MRI BRAIN (CPT=70551)  US CAROTID DOPPLER BILAT - DIAG IMG (CPT=93880)  CARD ECHO 2D DOPPLER (CPT=93306)  CARD MONITOR HOLTER ZIO 3-7 DAYS (CPT=93766/14859)

## 2023-11-08 ENCOUNTER — HOSPITAL ENCOUNTER (OUTPATIENT)
Dept: ULTRASOUND IMAGING | Facility: HOSPITAL | Age: 68
Discharge: HOME | End: 2023-11-08
Attending: INTERNAL MEDICINE
Payer: COMMERCIAL

## 2023-11-08 ENCOUNTER — HOSPITAL ENCOUNTER (OUTPATIENT)
Dept: CV DIAGNOSTICS | Facility: HOSPITAL | Age: 68
Discharge: HOME | End: 2023-11-08
Attending: INTERNAL MEDICINE
Payer: COMMERCIAL

## 2023-11-08 DIAGNOSIS — R55 PRE-SYNCOPE: ICD-10-CM

## 2023-11-08 DIAGNOSIS — I95.9 TRANSIENT HYPOTENSION: ICD-10-CM

## 2023-11-08 DIAGNOSIS — H53.9 VISION CHANGES: ICD-10-CM

## 2023-11-08 DIAGNOSIS — R51.9 ACUTE NONINTRACTABLE HEADACHE, UNSPECIFIED HEADACHE TYPE: ICD-10-CM

## 2023-11-08 DIAGNOSIS — R00.2 PALPITATIONS: ICD-10-CM

## 2023-11-08 PROCEDURE — 93243 EXT ECG>48HR<7D SCAN A/R: CPT | Performed by: INTERNAL MEDICINE

## 2023-11-08 PROCEDURE — 93242 EXT ECG>48HR<7D RECORDING: CPT | Performed by: INTERNAL MEDICINE

## 2023-11-08 PROCEDURE — 93226 XTRNL ECG REC<48 HR SCAN A/R: CPT | Performed by: INTERNAL MEDICINE

## 2023-11-08 PROCEDURE — 93880 EXTRACRANIAL BILAT STUDY: CPT | Performed by: INTERNAL MEDICINE

## 2023-11-10 ENCOUNTER — TELEPHONE (OUTPATIENT)
Dept: INTERNAL MEDICINE CLINIC | Facility: CLINIC | Age: 68
End: 2023-11-10

## 2023-11-11 ENCOUNTER — OFFICE VISIT (OUTPATIENT)
Dept: INTERNAL MEDICINE CLINIC | Facility: CLINIC | Age: 68
End: 2023-11-11
Payer: COMMERCIAL

## 2023-11-11 VITALS
HEART RATE: 86 BPM | SYSTOLIC BLOOD PRESSURE: 126 MMHG | HEIGHT: 63.5 IN | DIASTOLIC BLOOD PRESSURE: 85 MMHG | WEIGHT: 130 LBS | OXYGEN SATURATION: 96 % | BODY MASS INDEX: 22.75 KG/M2 | RESPIRATION RATE: 16 BRPM

## 2023-11-11 DIAGNOSIS — E04.1 THYROID NODULE: ICD-10-CM

## 2023-11-11 DIAGNOSIS — R55 SYNCOPE, UNSPECIFIED SYNCOPE TYPE: Primary | ICD-10-CM

## 2023-11-11 DIAGNOSIS — R51.9 SUDDEN ONSET OF SEVERE HEADACHE: ICD-10-CM

## 2023-11-11 PROCEDURE — 3074F SYST BP LT 130 MM HG: CPT | Performed by: INTERNAL MEDICINE

## 2023-11-11 PROCEDURE — 3008F BODY MASS INDEX DOCD: CPT | Performed by: INTERNAL MEDICINE

## 2023-11-11 PROCEDURE — 99214 OFFICE O/P EST MOD 30 MIN: CPT | Performed by: INTERNAL MEDICINE

## 2023-11-11 PROCEDURE — 3079F DIAST BP 80-89 MM HG: CPT | Performed by: INTERNAL MEDICINE

## 2023-11-11 NOTE — PATIENT INSTRUCTIONS
[facility]   [phone]  [address] [facility] [address]    [place]   [phone]  Mayra Villegas [address]

## 2023-11-12 ENCOUNTER — HOSPITAL ENCOUNTER (OUTPATIENT)
Dept: CT IMAGING | Age: 68
End: 2023-11-12
Attending: INTERNAL MEDICINE
Payer: COMMERCIAL

## 2023-11-12 ENCOUNTER — HOSPITAL ENCOUNTER (OUTPATIENT)
Dept: CT IMAGING | Age: 68
Discharge: HOME | End: 2023-11-12
Attending: INTERNAL MEDICINE
Payer: COMMERCIAL

## 2023-11-12 DIAGNOSIS — R51.9 SUDDEN ONSET OF SEVERE HEADACHE: ICD-10-CM

## 2023-11-12 DIAGNOSIS — R55 SYNCOPE, UNSPECIFIED SYNCOPE TYPE: ICD-10-CM

## 2023-11-12 PROCEDURE — 70470 CT HEAD/BRAIN W/O & W/DYE: CPT | Performed by: INTERNAL MEDICINE

## 2023-11-12 RX ORDER — IOHEXOL 350 MG/ML
75 INJECTION, SOLUTION INTRAVENOUS
Status: COMPLETED | OUTPATIENT
Start: 2023-11-12 | End: 2023-11-12

## 2023-11-12 RX ADMIN — IOHEXOL 75 ML: 350 INJECTION, SOLUTION INTRAVENOUS at 12:32:00

## 2023-11-13 ENCOUNTER — PATIENT MESSAGE (OUTPATIENT)
Dept: ENDOCRINOLOGY CLINIC | Facility: CLINIC | Age: 68
End: 2023-11-13

## 2023-11-14 NOTE — TELEPHONE ENCOUNTER
Dr. Maryuri Olmos,     Please see below message. RN addressed the patient assistance question. Thank you.

## 2023-11-14 NOTE — TELEPHONE ENCOUNTER
From: [place]  To: Jony Bermudez  Sent: 11/13/2023 5:14 PM CST  Subject: Prolia injection    Dr Brittaney Soria,  I am scheduled for a nurse visit on 11/27 for my second Prolia shot. Is there a patient assistance program similar to that for Tymlos? My copay for the last Prolia shot was about $1500. Also I was thrilled to see an increase in bone density in my spine. Can you explain how I had a significant decrease in density of the femoral neck? Is there anything more I could be doing to help that?   [place]

## 2023-11-15 ENCOUNTER — TELEPHONE (OUTPATIENT)
Dept: ENDOCRINOLOGY CLINIC | Facility: CLINIC | Age: 68
End: 2023-11-15

## 2023-11-15 NOTE — TELEPHONE ENCOUNTER
Patient calling regards prolia injection and copay information, requesting to speak to RN. Please call.

## 2023-11-27 ENCOUNTER — NURSE ONLY (OUTPATIENT)
Dept: ENDOCRINOLOGY CLINIC | Facility: CLINIC | Age: 68
End: 2023-11-27
Payer: COMMERCIAL

## 2023-11-27 DIAGNOSIS — M81.0 AGE-RELATED OSTEOPOROSIS WITHOUT CURRENT PATHOLOGICAL FRACTURE: Primary | ICD-10-CM

## 2023-11-27 PROCEDURE — 96372 THER/PROPH/DIAG INJ SC/IM: CPT | Performed by: INTERNAL MEDICINE

## 2023-11-27 NOTE — PROGRESS NOTES
Patient presented into clinic for Prolia injection. Prolia 60mg administered into right arm.  No side effects or complications    Patient brought in [employer] program card  Card number: [id number]  Member ID: [id number]  FSZ:[id number]  PCN: [id number]  Group: [id number]    Copy of co-pay program placed in brown bin     Patient requesting any further needs for copay program. Will f/u    Patient scheduled for 5/30/24 with f/u and next injection    Staff message sent    Patient left in stable condition

## 2023-11-27 NOTE — TELEPHONE ENCOUNTER
Reviewed Copay program with RN. Claim will be sent to billing, billing will send to insurance, once patient gets a bill from insurance, she would use the [address] program card. [facility] program information in scanning    [employer] message sent.

## 2023-11-28 NOTE — TELEPHONE ENCOUNTER
[address] [place] support co-pay program and was told patient should call them back to get virtual card if medication is buy and bill. RN responded to patient.

## 2023-12-04 ENCOUNTER — HOSPITAL ENCOUNTER (OUTPATIENT)
Dept: ULTRASOUND IMAGING | Age: 68
Discharge: HOME | End: 2023-12-04
Attending: INTERNAL MEDICINE
Payer: COMMERCIAL

## 2023-12-04 DIAGNOSIS — E04.1 THYROID NODULE: ICD-10-CM

## 2023-12-04 PROCEDURE — 76536 US EXAM OF HEAD AND NECK: CPT | Performed by: INTERNAL MEDICINE

## 2023-12-06 PROBLEM — E04.1 THYROID NODULE: Status: ACTIVE | Noted: 2023-12-06

## 2023-12-11 ENCOUNTER — HOSPITAL ENCOUNTER (OUTPATIENT)
Dept: CV DIAGNOSTICS | Facility: HOSPITAL | Age: 68
Discharge: HOME | End: 2023-12-11
Attending: INTERNAL MEDICINE
Payer: COMMERCIAL

## 2023-12-11 DIAGNOSIS — R00.2 PALPITATIONS: ICD-10-CM

## 2023-12-11 DIAGNOSIS — H53.9 VISION CHANGES: ICD-10-CM

## 2023-12-11 DIAGNOSIS — R51.9 ACUTE NONINTRACTABLE HEADACHE, UNSPECIFIED HEADACHE TYPE: ICD-10-CM

## 2023-12-11 DIAGNOSIS — I95.9 TRANSIENT HYPOTENSION: ICD-10-CM

## 2023-12-11 DIAGNOSIS — R55 PRE-SYNCOPE: ICD-10-CM

## 2023-12-11 PROCEDURE — 93306 TTE W/DOPPLER COMPLETE: CPT | Performed by: INTERNAL MEDICINE

## 2024-05-08 ENCOUNTER — OFFICE VISIT (OUTPATIENT)
Dept: INTERNAL MEDICINE CLINIC | Facility: CLINIC | Age: 69
End: 2024-05-08
Payer: COMMERCIAL

## 2024-05-08 VITALS
SYSTOLIC BLOOD PRESSURE: 128 MMHG | WEIGHT: 133 LBS | HEIGHT: 63 IN | RESPIRATION RATE: 18 BRPM | BODY MASS INDEX: 23.57 KG/M2 | OXYGEN SATURATION: 98 % | HEART RATE: 92 BPM | TEMPERATURE: 98 F | DIASTOLIC BLOOD PRESSURE: 82 MMHG

## 2024-05-08 DIAGNOSIS — R07.9 LEFT-SIDED CHEST PAIN: Primary | ICD-10-CM

## 2024-05-08 DIAGNOSIS — M89.8X1 PAIN OF LEFT SCAPULA: ICD-10-CM

## 2024-05-08 DIAGNOSIS — S46.812A STRAIN OF LEFT TRAPEZIUS MUSCLE, INITIAL ENCOUNTER: ICD-10-CM

## 2024-05-08 LAB
ATRIAL RATE: 88 BPM
P AXIS: 59 DEGREES
P-R INTERVAL: 166 MS
Q-T INTERVAL: 374 MS
QRS DURATION: 80 MS
QTC CALCULATION (BEZET): 452 MS
R AXIS: 66 DEGREES
T AXIS: 34 DEGREES
VENTRICULAR RATE: 88 BPM

## 2024-05-08 PROCEDURE — 93000 ELECTROCARDIOGRAM COMPLETE: CPT | Performed by: INTERNAL MEDICINE

## 2024-05-08 PROCEDURE — 3008F BODY MASS INDEX DOCD: CPT | Performed by: INTERNAL MEDICINE

## 2024-05-08 PROCEDURE — 3074F SYST BP LT 130 MM HG: CPT | Performed by: INTERNAL MEDICINE

## 2024-05-08 PROCEDURE — 99203 OFFICE O/P NEW LOW 30 MIN: CPT | Performed by: INTERNAL MEDICINE

## 2024-05-08 PROCEDURE — 3079F DIAST BP 80-89 MM HG: CPT | Performed by: INTERNAL MEDICINE

## 2024-05-08 RX ORDER — DENOSUMAB 60 MG/ML
INJECTION SUBCUTANEOUS
COMMUNITY

## 2024-05-08 RX ORDER — CYCLOBENZAPRINE HCL 5 MG
5 TABLET ORAL DAILY PRN
Qty: 10 TABLET | Refills: 0 | Status: SHIPPED | OUTPATIENT
Start: 2024-05-08 | End: 2024-05-08

## 2024-05-08 RX ORDER — CYCLOBENZAPRINE HCL 5 MG
5 TABLET ORAL DAILY PRN
Qty: 10 TABLET | Refills: 0 | Status: SHIPPED | OUTPATIENT
Start: 2024-05-08

## 2024-05-08 RX ORDER — METHYLPREDNISOLONE 4 MG/1
TABLET ORAL
Qty: 1 EACH | Refills: 0 | Status: SHIPPED | OUTPATIENT
Start: 2024-05-08

## 2024-05-08 NOTE — PROGRESS NOTES
Casie Falcon  1/11/1955    Chief Complaint   Patient presents with    Shoulder Pain     TA RM13     SUBJECTIVE   Casie Falcon is a 69 year old female who presents to [facility] care. She has a past medical history of osteoporosis, hyperlipidemia, IBS-D, KOKO.    She is here for evaluation of left neck, shoulder and chest pain x 5 weeks.  Started around [place] while in [place] playing with grandchildren  No falls or trauma.  Has been taking Aleve without significant improvement. Swimming and doing exercises in heated pool feels good  Pain is constant and sometimes wakes her up from sleep  Feels like a soreness and occurs while at rest, not necessarily with movement of the arm.  No paresthesias or weakness in the ipsilateral arm.  Of note she had frozen shoulder and an abnormal mammogram on the left side.    Review of Systems   Review of Systems   No f/c/chest pain or sob. No cough. No abd pain/n/v/d. No ha or dizziness. No numbness, tingling, or weakness. No other complaints today.    OBJECTIVE:   /82   Pulse 92   Temp 97.6 °F (36.4 °C)   Resp 18   Ht 5' 3\" (1.6 m)   Wt 133 lb (60.3 kg)   SpO2 98%   BMI 23.56 kg/m²   Physical Exam   Constitutional: Oriented to person, place, and time. No distress.   Cardiovascular: Normal rate, regular rhythm and intact distal pulses.  No murmur, rubs or gallops.   Pulmonary/Chest: Effort normal and breath sounds normal. No respiratory distress.  Musculoskeletal:Full range of motion in right shoulder. Tenderness over right trapezius muscle. The right scapula is tender along with right anterior chest. Apprehension,  Empty Can and Apley Scratch are essentially negative but the patient feels a slight strain.  Breast: No lumps or bumps palpated.    Lab Results   Component Value Date     (H) 11/06/2023    BUN 11 11/06/2023    CREATSERUM 0.80 11/06/2023    BUNCREA 13.8 11/06/2023    ANIONGAP 6 11/06/2023    GFRAA 91 06/04/2022    GFRNAA 79 06/04/2022    CA 10.0  11/06/2023     11/06/2023    K 4.7 11/06/2023     11/06/2023    CO2 26.0 11/06/2023    OSMOCALC 292 11/06/2023      Lab Results   Component Value Date    WBC 8.0 11/06/2023    RBC 4.92 11/06/2023    HGB 14.5 11/06/2023    HCT 44.8 11/06/2023    MCV 91.1 11/06/2023    MCH 29.5 11/06/2023    MCHC 32.4 11/06/2023    RDW 11.9 11/06/2023    .0 11/06/2023      Lab Results   Component Value Date    TSH 1.58 09/07/2022    TSHT4 2.15 06/03/2023        ASSESSMENT AND PLAN:       ICD-10-CM    1. Left-sided chest pain  R07.9 EKG with interpretation and Report -IN OFFICE [16916]     Physical Therapy Referral - [facility] Location     Basic Metabolic Panel (8) [E]     Vitamin D [E]     CANCELED: Vitamin B12 [E]      2. Pain of left scapula  M89.8X1 Physical Therapy Referral - [facility]      3. Strain of left trapezius muscle, initial encounter  S46.812A Physical Therapy Referral - [facility]      EKG showed normal sinus rhythm and no evidence of ischemia. Checking Vitamin D given history of deficiency. Reviewed last mammogram which benign and so we breast examination today. Favoring MSK etiology. Trial of muscle relaxant and steroid. Referring to PT. Follow up in 1 month.     The patient indicates understanding of these issues and agrees to the plan.  The patient is asked to return or present to the emergency room for worsening of symptoms.    TODAY'S ORDERS     No orders of the defined types were placed in this encounter.      Meds & Refills:  Requested Prescriptions      No prescriptions requested or ordered in this encounter       Imaging & Consults:  ELECTROCARDIOGRAM, COMPLETE    No follow-ups on file.  There are no Patient Instructions on file for this visit.    All questions were answered and the patient agrees with the plan.     Thank you,  Siria Turner, DO

## 2024-05-30 ENCOUNTER — OFFICE VISIT (OUTPATIENT)
Dept: ENDOCRINOLOGY CLINIC | Facility: CLINIC | Age: 69
End: 2024-05-30

## 2024-05-30 VITALS
DIASTOLIC BLOOD PRESSURE: 82 MMHG | SYSTOLIC BLOOD PRESSURE: 132 MMHG | HEART RATE: 85 BPM | BODY MASS INDEX: 22.88 KG/M2 | WEIGHT: 134 LBS | HEIGHT: 64 IN

## 2024-05-30 DIAGNOSIS — E04.1 THYROID NODULE: ICD-10-CM

## 2024-05-30 DIAGNOSIS — M81.0 AGE-RELATED OSTEOPOROSIS WITHOUT CURRENT PATHOLOGICAL FRACTURE: Primary | ICD-10-CM

## 2024-05-30 PROCEDURE — 99214 OFFICE O/P EST MOD 30 MIN: CPT | Performed by: INTERNAL MEDICINE

## 2024-05-30 PROCEDURE — 3075F SYST BP GE 130 - 139MM HG: CPT | Performed by: INTERNAL MEDICINE

## 2024-05-30 PROCEDURE — 96372 THER/PROPH/DIAG INJ SC/IM: CPT | Performed by: INTERNAL MEDICINE

## 2024-05-30 PROCEDURE — 3008F BODY MASS INDEX DOCD: CPT | Performed by: INTERNAL MEDICINE

## 2024-05-30 PROCEDURE — 3079F DIAST BP 80-89 MM HG: CPT | Performed by: INTERNAL MEDICINE

## 2024-05-30 NOTE — PROGRESS NOTES
Name: Casie Falcon  Date: 5/30/2024    Referring Physician: No ref. provider found    Chief Complaint   Patient presents with    Follow - Up     Osteoporosis and Prolia       HISTORY OF PRESENT ILLNESS   Casie Falcon is a 69 year old female who presents for   Chief Complaint   Patient presents with    Follow - Up     Osteoporosis and Prolia     70 y/o F presents for follow up evaluation of osteoporosis.  She was initially diagnosed with osteopenia in her 40s and started on alendronate therapy.  She was then transitioned to Actonel therapy and maintained on medication for 3-4 years.  She was then transitioned to drug holiday and has been off medication for more than 10 years.  She does have history of ankle fracture and toe fracture. No h/o nephrolithiasis.     Her DEXA this fall demonstrated osteoporosis therefore restarted on Actonel for 3 months.  She is maintained on Vitamin D 2000 units daily and Calcium 600mg PO daily.  No dairy intake.     Total hysterectomy at age 43 with both ovaries.  HRT for 6 months.      Grandmother hip fracture     5/2024   She has finished Tymlos therapy at the end of March 2023.  She tolerated well.  No falls or fractures since last visit.  She started prolia therapy in 5/2023 - received 2 injections in past year.  No side effects and she is tolerating well.      No further dental work.  Recent dental cleaning 2 weeks ago and cleared for prolia.     She is currently maintained on Calcium 600mg and VItamin D 2400 units daily.      REVIEW OF SYSTEMS  Eyes: no change in vision  Neurologic: no headache, generalized or focal weakness or numbness.  Head: normal  ENT: normal  Lungs: no shortness of breath, wheezing or DOVE  Cardiovascular:  no chest pain or palpitations  Gastrointestinal:  no abdominal pain, bowel movement problems  Musculoskeletal: no muscle pain or arthralgia  /Gyne: no frequency or discomfort while urinating  Psychiatric:  no acute distress, anxiety  or  depression  Skin: normal moisturized skin    Medications:     Current Outpatient Medications:     denosumab (PROLIA) 60 MG/ML Subcutaneous Solution Prefilled Syringe, Prolia, Disp: , Rfl:     cyclobenzaprine 5 MG Oral Tab, Take 1 tablet (5 mg total) by mouth daily as needed for Muscle spasms., Disp: 10 tablet, Rfl: 0    rosuvastatin 5 MG Oral Tab, Take 1 tablet (5 mg total) by mouth nightly., Disp: 90 tablet, Rfl: 3    Calcium Carbonate 600 MG Oral Tab, Take 1 tablet (600 mg total) by mouth., Disp: , Rfl:     Cholecalciferol (VITAMIN D) 50 MCG (2000 UT) Oral Tab, Take by mouth daily., Disp: , Rfl:     Olopatadine HCl 0.1 % Ophthalmic Solution, Place 1 drop into both eyes 2 (two) times daily. PRN, Disp: 5 mL, Rfl: 3    methylPREDNISolone (MEDROL) 4 MG Oral Tablet Therapy Pack, As directed., Disp: 1 each, Rfl: 0    Azelaic Acid 15 % External Gel, Apply topically 2 (two) times daily., Disp: , Rfl:      Allergies:   Allergies   Allergen Reactions    Compazine [Prochlorperazine] OTHER (SEE COMMENTS)     Extrpyramidal - uncontrolled facial movement       Social History:   Social History     Socioeconomic History    Marital status:    Tobacco Use    Smoking status: Never    Smokeless tobacco: Never   Vaping Use    Vaping status: Never Used   Substance and Sexual Activity    Alcohol use: Not Currently     Alcohol/week: 1.0 standard drink of alcohol     Types: 1 Standard drinks or equivalent per week     Comment: occasional    Drug use: No    Sexual activity: Yes     Partners: Male     Birth control/protection: Hysterectomy       Medical History:   Past Medical History:    Fibromyalgia    Irritable bowel syndrome    PONV (postoperative nausea and vomiting)       Surgical history:   Past Surgical History:   Procedure Laterality Date    Arthroscopy, shoulder, surgi Left     Breast biopsy Left 01/01/1991    Bso, omentectomy w/nathaniel      Age 43    Colonoscopy N/A 08/06/2015    Procedure: COLONOSCOPY;  Surgeon: Howie  MD Jorge;  Location: [facility] ENDOSCOPY    Hysterectomy      Ir central line (tlc) placement  01/01/1989    gtoeanahisus    Terry localization wire 1 site left (cpt=19281)         PHYSICAL EXAMINATION:  /82   Pulse 85   Ht 5' 4\" (1.626 m)   Wt 134 lb (60.8 kg)   BMI 23.00 kg/m²     General Appearance:  Alert, in no acute distress, well developed  Eyes: normal conjunctivae, sclera.  Ears/Nose/Mouth/Throat/Neck:  normal hearing, normal speech   Musculoskeletal:  normal muscle strength and tone  PV: normal pulses of carotids, pedals  Skin:  normal moisture and skin texture  Hair & Nails:  normal scalp hair     Neuro:  sensory grossly intact and motor grossly intact  Psychiatric:  oriented to time, self, and place  Nutritional:  no abnormal weight gain or loss    ASSESSMENT/PLAN:    1. Osteoporosis  - Discussed diagnosis with patient  - Significant bone loss likely secondary to premature menopause and family h/o osteoporosis  - She is at high risk of fracture  - Given young age with significant bone loss would recommend anabolic therapy  - She has now finished therapy with Tymlos   - Transition to prolia therapy, verbalized understanding of risks and benefits,  Injection #3 today   - Labs at goal  - DEXA improved 9/2023     2. Thyroid nodules   - Recheck Thyroid US 12/2024   - Recheck TFTs     RTC 1 year       5/30/2024  Nayla Guzman MD

## 2024-06-17 ENCOUNTER — OFFICE VISIT (OUTPATIENT)
Dept: INTERNAL MEDICINE CLINIC | Facility: CLINIC | Age: 69
End: 2024-06-17
Payer: COMMERCIAL

## 2024-06-17 ENCOUNTER — HOSPITAL ENCOUNTER (OUTPATIENT)
Dept: GENERAL RADIOLOGY | Age: 69
Discharge: HOME | End: 2024-06-17
Attending: INTERNAL MEDICINE

## 2024-06-17 ENCOUNTER — TELEPHONE (OUTPATIENT)
Dept: ULTRASOUND IMAGING | Age: 69
End: 2024-06-17

## 2024-06-17 VITALS
WEIGHT: 130.63 LBS | RESPIRATION RATE: 18 BRPM | HEIGHT: 64 IN | HEART RATE: 90 BPM | DIASTOLIC BLOOD PRESSURE: 68 MMHG | TEMPERATURE: 97 F | SYSTOLIC BLOOD PRESSURE: 122 MMHG | BODY MASS INDEX: 22.3 KG/M2 | OXYGEN SATURATION: 98 %

## 2024-06-17 DIAGNOSIS — M25.512 CHRONIC LEFT SHOULDER PAIN: ICD-10-CM

## 2024-06-17 DIAGNOSIS — E78.2 MIXED HYPERLIPIDEMIA: ICD-10-CM

## 2024-06-17 DIAGNOSIS — M89.8X1 CHRONIC SCAPULAR PAIN: ICD-10-CM

## 2024-06-17 DIAGNOSIS — G89.29 CHRONIC SCAPULAR PAIN: ICD-10-CM

## 2024-06-17 DIAGNOSIS — K63.5 BENIGN COLON POLYP: ICD-10-CM

## 2024-06-17 DIAGNOSIS — K58.0 IRRITABLE BOWEL SYNDROME WITH DIARRHEA: ICD-10-CM

## 2024-06-17 DIAGNOSIS — E55.9 VITAMIN D DEFICIENCY: ICD-10-CM

## 2024-06-17 DIAGNOSIS — S43.102A SEPARATION OF LEFT ACROMIOCLAVICULAR JOINT, INITIAL ENCOUNTER: ICD-10-CM

## 2024-06-17 DIAGNOSIS — F41.1 GENERALIZED ANXIETY DISORDER: ICD-10-CM

## 2024-06-17 DIAGNOSIS — Z00.00 MEDICARE ANNUAL WELLNESS VISIT, SUBSEQUENT: Primary | ICD-10-CM

## 2024-06-17 DIAGNOSIS — N64.4 BREAST PAIN, LEFT: ICD-10-CM

## 2024-06-17 DIAGNOSIS — M81.0 AGE-RELATED OSTEOPOROSIS WITHOUT CURRENT PATHOLOGICAL FRACTURE: Chronic | ICD-10-CM

## 2024-06-17 DIAGNOSIS — Z80.3 FAMILY HISTORY OF BREAST CANCER: ICD-10-CM

## 2024-06-17 DIAGNOSIS — Z85.828 HISTORY OF BASAL CELL CARCINOMA: ICD-10-CM

## 2024-06-17 DIAGNOSIS — G89.29 CHRONIC LEFT SHOULDER PAIN: ICD-10-CM

## 2024-06-17 DIAGNOSIS — E78.00 HYPERCHOLESTEROLEMIA: ICD-10-CM

## 2024-06-17 DIAGNOSIS — E04.1 THYROID NODULE: ICD-10-CM

## 2024-06-17 PROBLEM — G43.109 MIGRAINE WITH AURA AND WITHOUT STATUS MIGRAINOSUS, NOT INTRACTABLE: Status: RESOLVED | Noted: 2023-05-09 | Resolved: 2024-06-17

## 2024-06-17 PROCEDURE — 73010 X-RAY EXAM OF SHOULDER BLADE: CPT | Performed by: INTERNAL MEDICINE

## 2024-06-17 PROCEDURE — 73030 X-RAY EXAM OF SHOULDER: CPT | Performed by: INTERNAL MEDICINE

## 2024-06-17 RX ORDER — ROSUVASTATIN CALCIUM 5 MG/1
5 TABLET, COATED ORAL NIGHTLY
Qty: 90 TABLET | Refills: 3 | Status: SHIPPED | OUTPATIENT
Start: 2024-06-17

## 2024-06-17 RX ORDER — DUTASTERIDE 0.5 MG/1
0.5 CAPSULE, LIQUID FILLED ORAL DAILY
COMMUNITY
Start: 2024-06-14

## 2024-06-17 RX ORDER — CEPHALEXIN 500 MG/1
500 CAPSULE ORAL 4 TIMES DAILY
COMMUNITY
Start: 2024-06-11

## 2024-06-17 RX ORDER — FLUOCINONIDE GEL 0.5 MG/G
GEL TOPICAL DAILY
COMMUNITY
Start: 2024-05-30

## 2024-06-17 NOTE — PROGRESS NOTES
Casie Falcon  1/11/1955    Chief Complaint   Patient presents with    Well Adult     EJ RM 12- Pt is here for yearly physical     SUBJECTIVE   Casie Falcon is a 69 year old female who presents for physical examination.    Had Mohs on left hand last week for skin cancer. She was also diagnosed with FFA and Rx Dutasteride and Tofacitiinib by Dermatology.    Last colonoscopy in August 2023. Needs to sign RoR.    Still having pain in her left scapula sometimes radiating into her right breast with a zap in her nipple.    Her sister has lymphoma and  recently completed chemotherapy for prostate cancer at [facility].    Review of Systems   Review of Systems   No f/c/chest pain or sob. No cough. No abd pain/n/v/d. No ha or dizziness. No numbness, tingling, or weakness. No other complaints today.    OBJECTIVE:   /68   Pulse 90   Temp 96.5 °F (35.8 °C) (Temporal)   Resp 18   Ht 5' 4\" (1.626 m)   Wt 130 lb 9.6 oz (59.2 kg)   SpO2 98%   BMI 22.42 kg/m²   Physical Exam   Constitutional: Oriented to person, place, and time. No distress.   HEENT:  Oropharynx is clear and moist.   Eyes: Conjunctivae wnl.  Extraocular movements are intact  Cardiovascular: Normal rate, regular rhythm and intact distal pulses.  No murmur, rubs or gallops.   Pulmonary/Chest: Effort normal and breath sounds normal. No respiratory distress.  Abdominal: Soft. Bowel sounds are normal. Non tender, no masses, no organomegaly or hernias.  Musculoskeletal: No edema in bilateral lower extremities.  Strength is 5/5 in bilateral upper and lower extremities.  Neurological: No focal neurologic deficits.  Cranial nerves II through XII are intact.   Psychiatric: Normal mood and affect.     Lab Results   Component Value Date     (H) 11/06/2023    BUN 11 11/06/2023    CREATSERUM 0.80 11/06/2023    BUNCREA 13.8 11/06/2023    ANIONGAP 6 11/06/2023    GFRAA 91 06/04/2022    GFRNAA 79 06/04/2022    CA 10.0 11/06/2023      11/06/2023    K 4.7 11/06/2023     11/06/2023    CO2 26.0 11/06/2023    OSMOCALC 292 11/06/2023      Lab Results   Component Value Date    WBC 8.0 11/06/2023    RBC 4.92 11/06/2023    HGB 14.5 11/06/2023    HCT 44.8 11/06/2023    MCV 91.1 11/06/2023    MCH 29.5 11/06/2023    MCHC 32.4 11/06/2023    RDW 11.9 11/06/2023    .0 11/06/2023      Lab Results   Component Value Date    TSH 1.58 09/07/2022    TSHT4 2.15 06/03/2023     ASSESSMENT AND PLAN:       ICD-10-CM    1. Medicare annual wellness visit, subsequent  Z00.00       2. Age-related osteoporosis without current pathological fracture  M81.0 Management per Endocrinology. Continue Prolia therapy.      3. Mixed hyperlipidemia  E78.2 rosuvastatin 5 MG Oral Tab      4. Family history of breast cancer  Z80.3 CRISSY SUKHDEV 2D+3D DIAGNOSTIC CRISSY  BILAT (XHH=83800/60120)      5. Vitamin D deficiency  E55.9 Repeat Vitamin D level ordered.      6. Thyroid nodule  E04.1 Endocrinologist ordered TFT and repeat US in 1 year.      7. Generalized anxiety disorder  F41.1 Patient is doing her best to stay busy. Continue to monitor.      8. Chronic left shoulder pain  M25.512 XR SHOULDER, COMPLETE (MIN 2 VIEWS), LEFT (CPT=73030)    G89.29       9. Chronic scapular pain  M89.8X1 XR SCAPULA, COMPLETE, LEFT (CPT=73010)    G89.29       10. Breast pain, left  N64.4 CRISSY SUKHDEV 2D+3D DIAGNOSTIC CRISSY  BILAT (MNY=16554/57528)      11. Hypercholesterolemia  E78.00 Lipid Panel [E]      12. Benign colon polyp  K63.5 Continue surveillance colonoscopies per GI. Last 2023. Need to have patient sign RoR.       13. Irritable bowel syndrome with diarrhea  K58.0 Continue Loperamide PRN.      14. History of basal cell carcinoma  Z85.828 Management/surveillance per Dermatology.            The patient indicates understanding of these issues and agrees to the plan.  The patient is asked to return or present to the emergency room for worsening of symptoms.    TODAY'S ORDERS     Orders Placed This  Encounter   Procedures    Lipid Panel [E]       Meds & Refills:  Requested Prescriptions     Signed Prescriptions Disp Refills    rosuvastatin 5 MG Oral Tab 90 tablet 3     Sig: Take 1 tablet (5 mg total) by mouth nightly.       Imaging & Consults:  [facility] SUKHDEV 2D+3D DIAGNOSTIC [facility]  [facility]IM[facility] (CPT=77066/27509)    No follow-ups on file.  There are no Patient Instructions on file for this visit.    All questions were answered and the patient agrees with the plan.     Thank you,  Siria Turner, DO

## 2024-06-20 ENCOUNTER — LABORATORY ENCOUNTER (OUTPATIENT)
Dept: LAB | Age: 69
End: 2024-06-20
Attending: INTERNAL MEDICINE

## 2024-06-20 ENCOUNTER — LAB ENCOUNTER (OUTPATIENT)
Dept: LAB | Age: 69
End: 2024-06-20
Attending: INTERNAL MEDICINE

## 2024-06-20 DIAGNOSIS — R07.9 LEFT-SIDED CHEST PAIN: ICD-10-CM

## 2024-06-20 LAB
ANION GAP SERPL CALC-SCNC: 6 MMOL/L (ref 0–18)
BUN BLD-MCNC: 12 MG/DL (ref 9–23)
CALCIUM BLD-MCNC: 9 MG/DL (ref 8.5–10.1)
CHLORIDE SERPL-SCNC: 108 MMOL/L (ref 98–112)
CHOLEST SERPL-MCNC: 140 MG/DL (ref ?–200)
CO2 SERPL-SCNC: 27 MMOL/L (ref 21–32)
CREAT BLD-MCNC: 0.74 MG/DL
EGFRCR SERPLBLD CKD-EPI 2021: 88 ML/MIN/1.73M2 (ref 60–?)
FASTING PATIENT LIPID ANSWER: YES
FASTING STATUS PATIENT QL REPORTED: YES
GLUCOSE BLD-MCNC: 88 MG/DL (ref 70–99)
HDLC SERPL-MCNC: 55 MG/DL (ref 40–59)
LDLC SERPL CALC-MCNC: 63 MG/DL (ref ?–100)
NONHDLC SERPL-MCNC: 85 MG/DL (ref ?–130)
OSMOLALITY SERPL CALC.SUM OF ELEC: 291 MOSM/KG (ref 275–295)
POTASSIUM SERPL-SCNC: 4.8 MMOL/L (ref 3.5–5.1)
SODIUM SERPL-SCNC: 141 MMOL/L (ref 136–145)
T4 FREE SERPL-MCNC: 1 NG/DL (ref 0.8–1.7)
TRIGL SERPL-MCNC: 125 MG/DL (ref 30–149)
TSI SER-ACNC: 2.17 MIU/ML (ref 0.36–3.74)
VIT D+METAB SERPL-MCNC: 42.1 NG/ML (ref 30–100)
VLDLC SERPL CALC-MCNC: 19 MG/DL (ref 0–30)

## 2024-06-20 PROCEDURE — 80061 LIPID PANEL: CPT | Performed by: INTERNAL MEDICINE

## 2024-06-20 PROCEDURE — 80048 BASIC METABOLIC PNL TOTAL CA: CPT | Performed by: INTERNAL MEDICINE

## 2024-06-20 PROCEDURE — 82306 VITAMIN D 25 HYDROXY: CPT

## 2024-06-26 ENCOUNTER — HOSPITAL ENCOUNTER (OUTPATIENT)
Dept: MAMMOGRAPHY | Facility: HOSPITAL | Age: 69
Discharge: HOME | End: 2024-06-26
Attending: INTERNAL MEDICINE

## 2024-06-26 DIAGNOSIS — N64.4 BREAST PAIN, LEFT: ICD-10-CM

## 2024-06-26 DIAGNOSIS — Z80.3 FAMILY HISTORY OF BREAST CANCER: ICD-10-CM

## 2024-06-26 PROCEDURE — 77062 BREAST TOMOSYNTHESIS BI: CPT | Performed by: INTERNAL MEDICINE

## 2024-06-26 PROCEDURE — 77066 DX MAMMO INCL CAD BI: CPT | Performed by: INTERNAL MEDICINE

## 2024-06-26 PROCEDURE — 76642 ULTRASOUND BREAST LIMITED: CPT | Performed by: INTERNAL MEDICINE

## 2024-10-30 ENCOUNTER — TELEPHONE (OUTPATIENT)
Dept: ENDOCRINOLOGY CLINIC | Facility: CLINIC | Age: 69
End: 2024-10-30

## 2024-10-30 NOTE — TELEPHONE ENCOUNTER
Submitted Prolia Iv via [employer]. Awaiting 3-5 business days. Pt has an appt for 12/2/24.

## 2024-11-04 NOTE — TELEPHONE ENCOUNTER
Received pt's Evenity SOB via [employer]      PA Required: /no   Prolia OOP COST: 20%  Facility Fee: N/A  Admin Fee: 20%     Patient has an appointment for 12/2/24

## 2024-12-02 ENCOUNTER — NURSE ONLY (OUTPATIENT)
Dept: ENDOCRINOLOGY CLINIC | Facility: CLINIC | Age: 69
End: 2024-12-02
Payer: COMMERCIAL

## 2024-12-02 DIAGNOSIS — M81.0 AGE-RELATED OSTEOPOROSIS WITHOUT CURRENT PATHOLOGICAL FRACTURE: Primary | ICD-10-CM

## 2024-12-02 NOTE — PROGRESS NOTES
Patient seen today for prolia injection. Injection given to the left upper arm. Patient tolerated well.   used: no  Written medication information sheet provided: offered but patient declined    Discussed most common side effects of: bone and muscle pain, joint pain, dizziness, headache. Side effects typically only last up to 1 week.     Call office or be seen in ER with any of the following severe side effects: signs of allergic reaction (hives, difficulty breathing, redness or swelling at injection site, chest pain, shortness of breath), low blood calcium, osteonecrosis of the jaw.    Discussed with the patient if he/she plans on having any major dental work done to make sure their dentist and endocrinology provider is aware that they are taking prolia prior to treatment. This is due to increased risk of osteonecrosis or infection of the jaw while on this medication.     Today this is the 4th injection.  Last prolia injection on: 5/30/24  Summary of benefits completed: yes se TE from 10/30/24  PA required/completed: PA not required  Last Prolia protocol labs:     Component      Latest Ref Rng 6/3/2023 11/6/2023 6/20/2024   Glucose      70 - 99 mg/dL  109 (H)     Sodium      136 - 145 mmol/L  141     Potassium      3.5 - 5.1 mmol/L  4.7     Chloride      98 - 112 mmol/L  109     Carbon Dioxide, Total      21.0 - 32.0 mmol/L  26.0     ANION GAP      0 - 18 mmol/L  6     BUN      9 - 23 mg/dL  11     CREATININE      0.55 - 1.02 mg/dL  0.80     BUN/CREATININE RATIO      10.0 - 20.0   13.8     CALCIUM      8.7 - 10.4 mg/dL  10.0     CALCULATED OSMOLALITY      275 - 295 mOsm/kg  292     EGFR      >=60 mL/min/1.73m2  80     ALT (SGPT)      10 - 49 U/L  24     AST (SGOT)      <=34 U/L  23     ALKALINE PHOSPHATASE      55 - 142 U/L  55     Total Bilirubin      0.2 - 1.1 mg/dL  0.4     PROTEIN, TOTAL      5.7 - 8.2 g/dL  7.5     Albumin      3.2 - 4.8 g/dL  4.6     Globulin      2.8 - 4.4 g/dL  2.9     A/G  Ratio      1.0 - 2.0   1.6     Patient Fasting for CMP?  Yes     ALKALINE PHOSPHATASE, BONE SPECIFIC      5.6 - 29.0 mcg/L 21.9      PARATHYROID HORMONE,$INTACT      16 - 77 pg/mL 75      VITAMIN D, 25-OH, TOTAL      30.0 - 100.0 ng/mL   42.1       Vitamin D normal per protocol.   Last Dexa scan: 9/7/2023  Patient taking vitamin D supplementation: unknown dose but taking vitamin daily  Patient taking calcium supplementation: 600 mg daily of calcium     Patient advised to schedule 6 month follow up with Dr. Nayla Guzman, on or after 6/3/2025.   Next labs due: Prior to next appointment  Future labs ordered: yes     Staff message placed to MA pool ([facility] ENDO Medical Assistant) to perform repeat prolia insurance check in 4 months. done

## 2024-12-10 ENCOUNTER — HOSPITAL ENCOUNTER (OUTPATIENT)
Dept: ULTRASOUND IMAGING | Age: 69
Discharge: HOME | End: 2024-12-10
Attending: INTERNAL MEDICINE
Payer: COMMERCIAL

## 2024-12-10 DIAGNOSIS — E04.1 THYROID NODULE: ICD-10-CM

## 2024-12-10 PROCEDURE — 76536 US EXAM OF HEAD AND NECK: CPT | Performed by: INTERNAL MEDICINE

## 2025-01-21 ENCOUNTER — OFFICE VISIT (OUTPATIENT)
Dept: INTERNAL MEDICINE CLINIC | Facility: CLINIC | Age: 70
End: 2025-01-21
Payer: COMMERCIAL

## 2025-01-21 VITALS
HEART RATE: 101 BPM | DIASTOLIC BLOOD PRESSURE: 78 MMHG | HEIGHT: 64 IN | SYSTOLIC BLOOD PRESSURE: 132 MMHG | WEIGHT: 131.63 LBS | BODY MASS INDEX: 22.47 KG/M2 | OXYGEN SATURATION: 99 %

## 2025-01-21 DIAGNOSIS — B35.1 ONYCHOMYCOSIS: Primary | ICD-10-CM

## 2025-01-21 DIAGNOSIS — R25.2 CRAMPING OF FEET: ICD-10-CM

## 2025-01-21 PROBLEM — C44.311 BASAL CELL CARCINOMA (BCC) OF SKIN OF NOSE: Status: ACTIVE | Noted: 2023-07-31

## 2025-01-21 PROBLEM — R55 PRE-SYNCOPE: Status: ACTIVE | Noted: 2023-11-13

## 2025-01-21 PROBLEM — K58.9 IRRITABLE BOWEL SYNDROME (IBS): Status: ACTIVE | Noted: 2017-07-19

## 2025-01-21 PROBLEM — K63.5 POLYP OF COLON: Status: ACTIVE | Noted: 2017-07-19

## 2025-01-21 PROBLEM — E78.00 PURE HYPERCHOLESTEROLEMIA: Status: ACTIVE | Noted: 2025-01-21

## 2025-01-21 PROBLEM — Z86.0100 HISTORY OF COLONIC POLYPS: Status: ACTIVE | Noted: 2023-07-12

## 2025-01-21 PROBLEM — K57.30 DIVERTICULOSIS OF LARGE INTESTINE WITHOUT PERFORATION OR ABSCESS WITHOUT BLEEDING: Status: ACTIVE | Noted: 2023-08-07

## 2025-01-21 PROBLEM — M81.0 OSTEOPOROSIS: Status: ACTIVE | Noted: 2017-07-19

## 2025-01-21 PROCEDURE — G2211 COMPLEX E/M VISIT ADD ON: HCPCS | Performed by: INTERNAL MEDICINE

## 2025-01-21 PROCEDURE — 3008F BODY MASS INDEX DOCD: CPT | Performed by: INTERNAL MEDICINE

## 2025-01-21 PROCEDURE — 3078F DIAST BP <80 MM HG: CPT | Performed by: INTERNAL MEDICINE

## 2025-01-21 PROCEDURE — 99213 OFFICE O/P EST LOW 20 MIN: CPT | Performed by: INTERNAL MEDICINE

## 2025-01-21 PROCEDURE — 3075F SYST BP GE 130 - 139MM HG: CPT | Performed by: INTERNAL MEDICINE

## 2025-01-21 RX ORDER — CICLOPIROX 80 MG/ML
1 SOLUTION TOPICAL NIGHTLY
Qty: 6 ML | Refills: 1 | Status: SHIPPED | OUTPATIENT
Start: 2025-01-21

## 2025-01-21 NOTE — PROGRESS NOTES
Casie Falcon  1/11/1955    Chief Complaint   Patient presents with    Fungus Nails     Room 14, ASZ, fungus toe for 4-6 weeks, had this before.     SUBJECTIVE   Casie Falcon is a 70 year old female who presents for evaluation of toe nail fungus and toe cramping. Toe nail fungus has happened before. Currently in right great toe. Saw Podiatry at the time. Initially tried topical then oral therapy for weeks. She has also had cramping in right toes 2-3. Mainly at night. Happened for 4-5 days and then stopped. The patient is worried that this may be the first symptom of a systemic disease.    Review of Systems   Review of Systems   No f/c/chest pain or sob. No cough. No abd pain/n/v/d. No ha or dizziness.   OBJECTIVE:   /78 (BP Location: Right arm, Patient Position: Sitting, Cuff Size: adult)   Pulse 101   Ht 5' 4\" (1.626 m)   Wt 131 lb 9.6 oz (59.7 kg)   SpO2 99%   BMI 22.59 kg/m²   Physical Exam   Constitutional: Oriented to person, place, and time. No distress.   Pulmonary/Chest: Effort normal. No respiratory distress.  Musculoskeletal: No edema. Full range of motion in right foot and ankle. No tenderness of toes or swelling or joints. Slight discoloration and thickening of right great toe nail.  Neurological: No focal neurological deficits. Normal monofilament testing of the right foot.    Lab Results   Component Value Date    GLU 88 06/20/2024    BUN 12 06/20/2024    CREATSERUM 0.74 06/20/2024    BUNCREA 13.8 11/06/2023    ANIONGAP 6 06/20/2024    GFRAA 91 06/04/2022    GFRNAA 79 06/04/2022    CA 9.0 06/20/2024     06/20/2024    K 4.8 06/20/2024     06/20/2024    CO2 27.0 06/20/2024    OSMOCALC 291 06/20/2024      Lab Results   Component Value Date    WBC 8.0 11/06/2023    RBC 4.92 11/06/2023    HGB 14.5 11/06/2023    HCT 44.8 11/06/2023    MCV 91.1 11/06/2023    MCH 29.5 11/06/2023    MCHC 32.4 11/06/2023    RDW 11.9 11/06/2023    .0 11/06/2023      Lab Results    Component Value Date    T4F 1.0 06/20/2024    TSH 2.170 06/20/2024    TSHT4 2.15 06/03/2023      ASSESSMENT AND PLAN:       ICD-10-CM    1. Onychomycosis  B35.1 Ciclopirox 8 % External Solution      2. Cramping of feet  R25.2 Iron And Tibc [E]     Magnesium [E]  Patient's endocrinologist already ordered BMP. Continue to hydrate.              TODAY'S ORDERS     No orders of the defined types were placed in this encounter.      Meds & Refills:  Requested Prescriptions      No prescriptions requested or ordered in this encounter       Imaging & Consults:  None    No follow-ups on file.  There are no Patient Instructions on file for this visit.    All questions were answered and the patient agrees with the plan.     Thank you,  Siria Turner, DO

## 2025-04-25 ENCOUNTER — PATIENT MESSAGE (OUTPATIENT)
Dept: ENDOCRINOLOGY CLINIC | Facility: CLINIC | Age: 70
End: 2025-04-25

## 2025-04-25 DIAGNOSIS — D35.00 ADRENAL ADENOMA, UNSPECIFIED LATERALITY: Primary | ICD-10-CM

## 2025-05-07 ENCOUNTER — TELEPHONE (OUTPATIENT)
Dept: ENDOCRINOLOGY CLINIC | Facility: CLINIC | Age: 70
End: 2025-05-07

## 2025-05-07 NOTE — TELEPHONE ENCOUNTER
Pt's last Prolia injection was on 12/2/24. Pt will be due for next injection on or after 6/3/2025.     Pt has an upcoming appt on 06/05/2025 for Prolia Injection with .      Submitted Prolia IV via [employer] portal  Awaiting SOB, 3-5 business days

## 2025-05-07 NOTE — TELEPHONE ENCOUNTER
----- Message from Graciela FITZPATRICK sent at 12/2/2024  9:31 AM CST -----  Regarding: SILVER GARNETT [[id number]]  Last prolia given 12/2/24  Next due on or after 6/3/25  Please perform insurance verification request. Thank you.

## 2025-05-13 ENCOUNTER — PATIENT MESSAGE (OUTPATIENT)
Dept: ENDOCRINOLOGY CLINIC | Facility: CLINIC | Age: 70
End: 2025-05-13

## 2025-05-20 NOTE — TELEPHONE ENCOUNTER
Received fax from [employer], pt is no longer contracted with the [facility] PPO, it was terminated 03/29/2025.    Pt's insurance has been updated via my chart to [facility], new card has been attached      Will resubmit insurance verification through [employer] with updated insurance information, to see if pt's requires a PA for upcoming prolia injection.     Submitted Prolia IV via [employer] portal  Awaiting SOB, 3-5 business days

## 2025-05-28 ENCOUNTER — TELEPHONE (OUTPATIENT)
Facility: CLINIC | Age: 70
End: 2025-05-28

## 2025-05-28 NOTE — TELEPHONE ENCOUNTER
Patient calling states is at [facility] [email or web address] in [place] now, they do not have other orders available if can be resent to [facility]. Please call.

## 2025-06-01 LAB
ALKALINE PHOSPHATASE, BONE SPECIFIC: 6.9 MCG/L (ref 5.6–29)
CALCIUM: 9.7 MG/DL (ref 8.6–10.4)
PARATHYROID HORMONE,$INTACT: 27 PG/ML (ref 16–77)
PLASMA RENIN ACTIVITY,$/MS/MS: 1.57 NG/ML/H (ref 0.25–5.82)
VITAMIN D, 25-OH, TOTAL: 44 NG/ML (ref 30–100)

## 2025-06-04 NOTE — TELEPHONE ENCOUNTER
Received pt's Prolia SOB via [employer] **    PA Required: YES  Prolia OOP COST: $0  Facility Fee: NA  Admin Fee: $60

## 2025-06-05 ENCOUNTER — OFFICE VISIT (OUTPATIENT)
Dept: ENDOCRINOLOGY CLINIC | Facility: CLINIC | Age: 70
End: 2025-06-05
Payer: COMMERCIAL

## 2025-06-05 VITALS — WEIGHT: 132 LBS | BODY MASS INDEX: 22.53 KG/M2 | HEIGHT: 64 IN

## 2025-06-05 DIAGNOSIS — M81.0 AGE-RELATED OSTEOPOROSIS WITHOUT CURRENT PATHOLOGICAL FRACTURE: Primary | ICD-10-CM

## 2025-06-05 DIAGNOSIS — E04.1 THYROID NODULE: ICD-10-CM

## 2025-06-05 DIAGNOSIS — D35.01 ADRENAL ADENOMA, RIGHT: ICD-10-CM

## 2025-06-05 PROCEDURE — 96372 THER/PROPH/DIAG INJ SC/IM: CPT | Performed by: INTERNAL MEDICINE

## 2025-06-05 PROCEDURE — 99214 OFFICE O/P EST MOD 30 MIN: CPT | Performed by: INTERNAL MEDICINE

## 2025-06-05 NOTE — PROGRESS NOTES
Called Sohan to check status on PA for Prolia, spoke with electronic assistance. She stated requires PA.  [phone]    Was transferred to PA dept . Spoke with Ibeth FITZPATRICK she stated PA is still under review but since pt is in the office now she got in contact with clinician.    Was transferred to clinician Kendell who stated prolia has been approved from 6/5/25 -6/4/26     Reference # - [id number]    Call time: 45 minutes.

## 2025-06-05 NOTE — PROGRESS NOTES
Name: Casie Falcon  Date: 6/5/2025    Referring Physician: No ref. provider found    Chief Complaint   Patient presents with    Osteoporosis     Patient is in to follow up, Prolia Injection. Patient reports no recent falls/dental work since last visit with MD however mentioned to have had a compression fracture of T12 in 2/2025.       HISTORY OF PRESENT ILLNESS   Casie Falcon is a 70 year old female who presents for   Chief Complaint   Patient presents with    Osteoporosis     Patient is in to follow up, Prolia Injection. Patient reports no recent falls/dental work since last visit with MD however mentioned to have had a compression fracture of T12 in 2/2025.     69 y/o F presents for follow up evaluation of osteoporosis.  She was initially diagnosed with osteopenia in her 40s and started on alendronate therapy.  She was then transitioned to Actonel therapy and maintained on medication for 3-4 years.  She was then transitioned to drug holiday and has been off medication for more than 10 years.  She does have history of ankle fracture and toe fracture. No h/o nephrolithiasis.     Her DEXA this fall demonstrated osteoporosis therefore restarted on Actonel for 3 months.  She is maintained on Vitamin D 2000 units daily and Calcium 600mg PO daily.  No dairy intake.     Total hysterectomy at age 43 with both ovaries.  HRT for 6 months.      Grandmother hip fracture     6/2025   She has finished Tymlos therapy at the end of March 2023.  She tolerated well.  No falls or fractures since last visit.  She started prolia therapy in 5/2023 - received 4 injections total. No side effects and she is tolerating well.  No falls or fractures since last visit.     Of note she did have CT scan in 2/2025 which demonstrated old compression fracture of T12 and adrenal adenoma.  She has also been followed for thyroid nodules and notes mild increase in compression fractures.      No further dental work.     She is currently  maintained on Calcium 600mg and VItamin D 800 units daily.      REVIEW OF SYSTEMS  Eyes: no change in vision  Neurologic: no headache, generalized or focal weakness or numbness.  Head: normal  ENT: normal  Lungs: no shortness of breath, wheezing or DOVE  Cardiovascular:  no chest pain or palpitations  Gastrointestinal:  no abdominal pain, bowel movement problems  Musculoskeletal: no muscle pain or arthralgia  /Gyne: no frequency or discomfort while urinating  Psychiatric:  no acute distress, anxiety  or depression  Skin: normal moisturized skin    Medications:     Current Outpatient Medications:     Ciclopirox 8 % External Solution, Apply 1 Application topically nightly. apply once daily to the affected nail, 5 mm of surrounding skin, and to the nail bed,  and undersurface of the nail plate if possible., Disp: 6 mL, Rfl: 1    dutasteride 0.5 MG Oral Cap, Take 1 capsule (0.5 mg total) by mouth daily., Disp: , Rfl:     Fluocinonide 0.05 % External Gel, Apply topically daily., Disp: , Rfl:     rosuvastatin 5 MG Oral Tab, Take 1 tablet (5 mg total) by mouth nightly., Disp: 90 tablet, Rfl: 3    denosumab (PROLIA) 60 MG/ML Subcutaneous Solution Prefilled Syringe, Prolia, Disp: , Rfl:     Calcium Carbonate 600 MG Oral Tab, Take 1 tablet (600 mg total) by mouth., Disp: , Rfl:     Cholecalciferol (VITAMIN D) 50 MCG (2000 UT) Oral Tab, Take by mouth daily., Disp: , Rfl:     Olopatadine HCl 0.1 % Ophthalmic Solution, Place 1 drop into both eyes 2 (two) times daily. PRN, Disp: 5 mL, Rfl: 3    Azelaic Acid 15 % External Gel, Apply topically 2 (two) times daily., Disp: , Rfl:      Allergies:   Allergies   Allergen Reactions    Compazine [Prochlorperazine] OTHER (SEE COMMENTS)     Extrpyramidal - uncontrolled facial movement       Social History:   Social History     Socioeconomic History    Marital status:    Tobacco Use    Smoking status: Never     Passive exposure: Never    Smokeless tobacco: Never   Vaping Use     Vaping status: Never Used   Substance and Sexual Activity    Alcohol use: Not Currently     Alcohol/week: 1.0 standard drink of alcohol     Types: 1 Standard drinks or equivalent per week     Comment: occasional    Drug use: No    Sexual activity: Yes     Partners: Male     Birth control/protection: Hysterectomy       Medical History:   Past Medical History:    Fibromyalgia    Irritable bowel syndrome    PONV (postoperative nausea and vomiting)       Surgical history:   Past Surgical History:   Procedure Laterality Date    Arthroscopy, shoulder, surgi Left     Breast biopsy Left 01/01/1991    Bso, omentectomy w/nathaniel      Age 43    Colonoscopy N/A 08/06/2015    Procedure: COLONOSCOPY;  Surgeon: Jorge Denise MD;  Location:  ENDOSCOPY    Hysterectomy      Ir central line (tlc) placement  01/01/1989    gtoerenesus    Needle biopsy left         PHYSICAL EXAMINATION:  Ht 5' 4\" (1.626 m)   Wt 132 lb (59.9 kg)   BMI 22.66 kg/m²     General Appearance:  Alert, in no acute distress, well developed  Eyes: normal conjunctivae, sclera.  Ears/Nose/Mouth/Throat/Neck:  normal hearing, normal speech   Musculoskeletal:  normal muscle strength and tone  PV: normal pulses of carotids, pedals  Skin:  normal moisture and skin texture  Hair & Nails:  normal scalp hair     Neuro:  sensory grossly intact and motor grossly intact  Psychiatric:  oriented to time, self, and place  Nutritional:  no abnormal weight gain or loss    ASSESSMENT/PLAN:    1. Osteoporosis  - Discussed diagnosis with patient  - Significant bone loss likely secondary to premature menopause and family h/o osteoporosis  - She is at high risk of fracture  - Given young age with significant bone loss would recommend anabolic therapy  - She has now finished therapy with Tymlos   - Transition to prolia therapy, verbalized understanding of risks and benefits,  Injection #5 today   - Labs at goal  - DEXA improved 9/2023 - recheck DEXA     2. Thyroid nodules   - Thyroid US  stable 12/2024 - recheck imaging in the fall    3. Adrenal Nodule   - Discussed new diagnosis, no concerning features on imaging  - Adrenal hormones pending  - Recheck CT scan 2/2026     RTC 1 year       6/5/2025  Nayla Guzman MD

## 2025-06-06 ENCOUNTER — TELEPHONE (OUTPATIENT)
Dept: INTERNAL MEDICINE CLINIC | Facility: CLINIC | Age: 70
End: 2025-06-06

## 2025-06-06 DIAGNOSIS — Z13.0 SCREENING FOR BLOOD DISEASE: ICD-10-CM

## 2025-06-06 DIAGNOSIS — Z13.220 SCREENING FOR LIPOID DISORDERS: ICD-10-CM

## 2025-06-06 DIAGNOSIS — Z00.00 ROUTINE GENERAL MEDICAL EXAMINATION AT HEALTH CARE FACILITY: Primary | ICD-10-CM

## 2025-06-06 DIAGNOSIS — Z13.29 SCREENING FOR THYROID DISORDER: ICD-10-CM

## 2025-06-06 DIAGNOSIS — Z13.228 SCREENING FOR METABOLIC DISORDER: ICD-10-CM

## 2025-06-06 NOTE — TELEPHONE ENCOUNTER
Endo staff,     It looks like someone from your office already submitted this PA. Prolia has been approved, authorization number [id number], valid 6/5/25-6/4/26.

## 2025-06-06 NOTE — TELEPHONE ENCOUNTER
Patient called request labs prior to their annual physical.  Annual physical scheduled for 6/18/25.   Please order labs. Patient preferred lab is [facility]  Patient informed request was sent to clinical team.  Patient informed to fast for labs.  No callback required.   Future Appointments   Date Time Provider Department Center   6/18/2025 11:00 AM Siria Turner DO [facility]

## 2025-06-07 LAB
ALDOSTERONE, /MS/MS: 2 NG/DL
BUN: 12 MG/DL (ref 7–25)
CALCIUM: 9.6 MG/DL (ref 8.6–10.4)
CARBON DIOXIDE: 25 MMOL/L (ref 20–32)
CHLORIDE: 104 MMOL/L (ref 98–110)
CORTISOL, TOTAL: 18.7 MCG/DL
CREATININE: 0.7 MG/DL (ref 0.6–1)
EGFR: 93 ML/MIN/1.73M2
GLUCOSE: 88 MG/DL (ref 65–99)
METANEPHRINE, FREE: <25 PG/ML
NORMETANEPHRINE, FREE: 126 PG/ML
POTASSIUM: 4.3 MMOL/L (ref 3.5–5.3)
SODIUM: 139 MMOL/L (ref 135–146)
TOTAL, FREE (MN+NMN): 126 PG/ML

## 2025-06-18 ENCOUNTER — MED REC SCAN ONLY (OUTPATIENT)
Dept: INTERNAL MEDICINE CLINIC | Facility: CLINIC | Age: 70
End: 2025-06-18

## 2025-06-18 ENCOUNTER — OFFICE VISIT (OUTPATIENT)
Dept: INTERNAL MEDICINE CLINIC | Facility: CLINIC | Age: 70
End: 2025-06-18
Payer: COMMERCIAL

## 2025-06-18 VITALS
TEMPERATURE: 97 F | DIASTOLIC BLOOD PRESSURE: 80 MMHG | OXYGEN SATURATION: 97 % | WEIGHT: 131 LBS | HEIGHT: 64 IN | SYSTOLIC BLOOD PRESSURE: 120 MMHG | RESPIRATION RATE: 16 BRPM | BODY MASS INDEX: 22.36 KG/M2 | HEART RATE: 104 BPM

## 2025-06-18 DIAGNOSIS — Z86.0100 HISTORY OF COLONIC POLYPS: ICD-10-CM

## 2025-06-18 DIAGNOSIS — F41.1 GENERALIZED ANXIETY DISORDER: ICD-10-CM

## 2025-06-18 DIAGNOSIS — E27.9 ADRENAL NODULE (HCC): ICD-10-CM

## 2025-06-18 DIAGNOSIS — Z12.31 ENCOUNTER FOR SCREENING MAMMOGRAM FOR MALIGNANT NEOPLASM OF BREAST: ICD-10-CM

## 2025-06-18 DIAGNOSIS — E78.2 MIXED HYPERLIPIDEMIA: ICD-10-CM

## 2025-06-18 DIAGNOSIS — N83.209 CYST OF OVARY, UNSPECIFIED LATERALITY: ICD-10-CM

## 2025-06-18 DIAGNOSIS — B35.1 ONYCHOMYCOSIS: ICD-10-CM

## 2025-06-18 DIAGNOSIS — E04.1 THYROID NODULE: ICD-10-CM

## 2025-06-18 DIAGNOSIS — K57.30 DIVERTICULOSIS OF LARGE INTESTINE WITHOUT PERFORATION OR ABSCESS WITHOUT BLEEDING: ICD-10-CM

## 2025-06-18 DIAGNOSIS — Z00.00 WELLNESS EXAMINATION: Primary | ICD-10-CM

## 2025-06-18 DIAGNOSIS — R15.9 INCONTINENCE OF FECES WITH FECAL URGENCY: ICD-10-CM

## 2025-06-18 DIAGNOSIS — R15.2 INCONTINENCE OF FECES WITH FECAL URGENCY: ICD-10-CM

## 2025-06-18 DIAGNOSIS — R30.0 DYSURIA: ICD-10-CM

## 2025-06-18 DIAGNOSIS — E55.9 VITAMIN D DEFICIENCY: ICD-10-CM

## 2025-06-18 DIAGNOSIS — M81.0 AGE-RELATED OSTEOPOROSIS WITHOUT CURRENT PATHOLOGICAL FRACTURE: ICD-10-CM

## 2025-06-18 DIAGNOSIS — Z80.3 FAMILY HISTORY OF BREAST CANCER: ICD-10-CM

## 2025-06-18 DIAGNOSIS — K58.2 IRRITABLE BOWEL SYNDROME WITH BOTH CONSTIPATION AND DIARRHEA: ICD-10-CM

## 2025-06-18 DIAGNOSIS — K59.09 CHRONIC CONSTIPATION: ICD-10-CM

## 2025-06-18 DIAGNOSIS — Z85.828 HISTORY OF BASAL CELL CARCINOMA: ICD-10-CM

## 2025-06-18 PROBLEM — C44.311 BASAL CELL CARCINOMA (BCC) OF SKIN OF NOSE: Status: RESOLVED | Noted: 2023-07-31 | Resolved: 2025-06-18

## 2025-06-18 PROBLEM — R55 PRE-SYNCOPE: Status: RESOLVED | Noted: 2023-11-13 | Resolved: 2025-06-18

## 2025-06-18 PROBLEM — R92.8 ABNORMAL MAMMOGRAM: Status: RESOLVED | Noted: 2023-07-31 | Resolved: 2025-06-18

## 2025-06-18 PROBLEM — K63.5 POLYP OF COLON: Status: RESOLVED | Noted: 2017-07-19 | Resolved: 2025-06-18

## 2025-06-18 PROBLEM — E78.00 PURE HYPERCHOLESTEROLEMIA: Status: RESOLVED | Noted: 2025-01-21 | Resolved: 2025-06-18

## 2025-06-18 PROCEDURE — 99397 PER PM REEVAL EST PAT 65+ YR: CPT | Performed by: INTERNAL MEDICINE

## 2025-06-18 PROCEDURE — 99213 OFFICE O/P EST LOW 20 MIN: CPT | Performed by: INTERNAL MEDICINE

## 2025-06-18 NOTE — PROGRESS NOTES
Subjective:   Casie Falcon is a 70 year old female who presents for a Annual Physical Exam (not Medicare, or ABN signed for Medicare non covered service) and scheduled follow up of multiple significant but stable problems.   History of Present Illness    Paraovarian cyst  Wakes up soiled in diarrhea  Was told she has IBS but has elimited triggers such as olivia  No longer getting abdmoinal pain like when she was younger also had gas  She is otherwise constipated    Discomfort not pain when she urinates. Denies blood or odor. Urine clear. Frequency has to do with her stress level    Does not take stool softeners but has been trying to increase fiber  Does not like miraLAX  Shredded wheat and prunes - daily or every other day    History/Other:   Fall Risk Assessment: {Lorenzo  Fall Risk Assessment:8307}  She has been screened for Falls and is High Risk. Fall Prevention information provided to patient in After Visit Summary.    Do you feel unsteady when standing or walking?: No  Do you worry about falling?: Yes  Have you fallen in the past year?: No     Cognitive Assessment: {Tip  Cognitive Assessment:8307}  *** (Incomplete)  Tip  Cognitive assessment incomplete. Refresh to ensure screening pulls in; if not,click link above to assess, then refresh:8307}      Functional Ability/Status: {Tip  Functional Status:8307}  *** (Incomplete)  {Tip  Functional status incomplete. Refresh to ensure screening pulls in; if not, click link above to assess, then refresh:8307}      Depression Screening (PHQ):{Tip  Depression Screenin}  PHQ-2 SCORE: 0  , done 2025   Last [place] Suicide Screening on 2025 was No Risk.     {Option to record time spent screening & counseling patient for depression (5+ minutes = ):32580::\" \"}    Advanced Directives: {Tip  Advance Care Plannin}  She does NOT have a Living Will. [ ]  She does NOT have a Power of  for Health Care. [ ]  {Advanced Directive  Status:7286::\"Discussed Advance Care Planning with patient (and family/surrogate if present). Standard forms made available to patient in After Visit Summary.\"}    {Tip [facility]PSHFHProbLRamseygingCardioLabAllergiesImm :8307}  Problem List[1]  Allergies:  She is allergic to compazine [prochlorperazine].    Current Medications:  Active Meds, Sig Only[2]    Medical History:  She  has a past medical history of Basal cell carcinoma (BCC) in situ of skin, Fibromyalgia, Irritable bowel syndrome, and PONV (postoperative nausea and vomiting).  Surgical History:  She  has a past surgical history that includes ir central line (tlc) placement (01/01/1989); Breast biopsy (Left, 01/01/1991); arthroscopy, shoulder, surgi (Left); bso, omentectomy w/nathaniel; hysterectomy; colonoscopy (N/A, 08/06/2015); and needle biopsy left.   Family History:  Her family history includes ADD in an other family member; Allergic rhinitis in her paternal grandmother and another family member; Asthma in her paternal grandmother; Atrial fibrilation in her father; Breast Cancer (age of onset: 78) in her sister; Dementia in her maternal grandmother, mother, and another family member; Depression in her mother and another family member; Heart Disease in her maternal grandfather, paternal grandfather, and another family member; Hyperlipidemia in her father and another family member; Lung cancer in an other family member; Osteoporosis in her maternal grandmother, mother, and paternal grandmother; Ovarian Cancer (age of onset: 60) in her maternal aunt; Prostate Cancer in her brother; Stroke in her father and another family member; cns lymphoma in her sister.  Social History:  She  reports that she has never smoked. She has never been exposed to tobacco smoke. She has never used smokeless tobacco. She reports current alcohol use of about 2.0 standard drinks of alcohol per week. She reports that she does not use drugs.    Tobacco:  She has never smoked  tobacco.    CAGE Alcohol Screen: {Tip  CAGE Alcohol Screen:8307}  *** Cage NOT Performed in the last 6 months, please do assessment! Last Cage score was 0 on 2019.    {Tip   Care Team:8307}  Patient Care Team:  Siria Turner DO as PCP - General (Internal Medicine)    Review of Systems  {Female Review of Systems (Optional):7352}    Objective:   Physical Exam  {Use this to document a Female Complete Physical Exam (pelvic deferred) - Defaults to Blank -DEL to delete as it is not needed for AWV but is needed for CPX or Medicare Supervisit:6118}    /80   Pulse 104   Temp 96.8 °F (36 °C) (Temporal)   Resp 16   Ht 5' 4\" (1.626 m)   Wt 131 lb (59.4 kg)   SpO2 97%   BMI 22.49 kg/m²  Estimated body mass index is 22.49 kg/m² as calculated from the following:    Height as of this encounter: 5' 4\" (1.626 m).    Weight as of this encounter: 131 lb (59.4 kg).    Medicare Hearing Assessment: {Tip (Required for AWV/SWV) Hearing Assessment:8307}  *** (Incomplete)  {Tip  Hearing Screening incomplete. Refresh to ensure screening pulls in; if not, click link above to assess, then refresh:830    {Tip  Vision Screenin}  {Tip  Vision Screening incomplete. Required for IPPE/first MA Supervisit. Click link above to assess, then refresh. If vision screening not recorded, this link will disappear upon signing note/encounter:8307}    Assessment & Plan:   Casie Falcon is a 70 year old female who presents for a Medicare Assessment.     There are no diagnoses linked to this encounter.  Assessment & Plan    The patient indicates understanding of these issues and agrees to the plan.  {lifestyle and A/P options:5845::\"Reinforced healthy diet, lifestyle, and exercise.\"}    {Tip  Follow Up:8307}  No follow-ups on file.     Siria Turner DO, 2025     Supplementary Documentation:   General Health:       Health Maintenance   Topic Date Due    COVID-19 Vaccine ( season) 2025     Annual Physical  06/17/2025    Mammogram  06/26/2025    Colorectal Cancer Screening  08/07/2028    Influenza Vaccine  Completed    DEXA Scan  Completed    Annual Depression Screening  Completed    Fall Risk Screening (Annual)  Completed    Pneumococcal Vaccine: 50+ Years  Completed    Zoster Vaccines  Completed    Meningococcal B Vaccine  Aged Out            [1]   Patient Active Problem List  Diagnosis    Osteoporosis    Irritable bowel syndrome (IBS)    Polyp of colon    Vitamin D deficiency    Generalized anxiety disorder    Mixed hyperlipidemia    Incontinence of feces    Family history of breast cancer    Abnormal mammogram    History of basal cell carcinoma    Thyroid nodule    Basal cell carcinoma (BCC) of skin of nose    Diverticulosis of large intestine without perforation or abscess without bleeding    History of colonic polyps    Pre-syncope    Pure hypercholesterolemia   [2]   Outpatient Medications Marked as Taking for the 6/18/25 encounter (Office Visit) with Siria Turner, DO   Medication Sig    Ciclopirox 8 % External Solution Apply 1 Application topically nightly. apply once daily to the affected nail, 5 mm of surrounding skin, and to the nail bed,  and undersurface of the nail plate if possible.    dutasteride 0.5 MG Oral Cap Take 1 capsule (0.5 mg total) by mouth daily.    Fluocinonide 0.05 % External Gel Apply topically daily.    rosuvastatin 5 MG Oral Tab Take 1 tablet (5 mg total) by mouth nightly.    denosumab (PROLIA) 60 MG/ML Subcutaneous Solution Prefilled Syringe Prolia    Calcium Carbonate 600 MG Oral Tab Take 1 tablet (600 mg total) by mouth.    Cholecalciferol (VITAMIN D) 50 MCG (2000 UT) Oral Tab Take by mouth daily.    Olopatadine HCl 0.1 % Ophthalmic Solution Place 1 drop into both eyes 2 (two) times daily. PRN    Azelaic Acid 15 % External Gel Apply topically 2 (two) times daily.     Current Facility-Administered Medications for the 6/18/25 encounter (Office Visit) with  Siria Turner, DO   Medication    denosumab (Prolia) 60 MG/ML SUBQ injection 60 mg

## 2025-06-18 NOTE — PROGRESS NOTES
Pt brought colonoscopy report. Abstracted, every 5 years per report. Placed in MP bin for review.

## 2025-06-18 NOTE — PROGRESS NOTES
Casie Falcon  1/11/1955    Chief Complaint   Patient presents with    Physical     Rm 8 SS  Wants to discuss para ovarian cyst.        SUBJECTIVE   Casie Falcon is a 70 year old female who presents for annual physical examination.    The patient had a whole-body MRI which showed a paraovarian cyst.  Patient had a total hysterectomy  in the 90s and is less concerned about the finding.  She continues to struggle with diarrhea that is sometimes uncontrolled.  Some mornings she wakes up soiled.  Other times such as while driving she will have a large bowel movement with no warning.  Most recent colonoscopy was unremarkable.  She has colonoscopies at least every 5 years given family history.  If not having diarrhea, sometimes she is constipated.  She does not like stool softeners like MiraLAX.  She has been doing her best to increase fiber.  She is eating prunes and shredded wheat almost daily.  For the last several days she has had discomfort while urinating.  She denies significant pain, hematuria,, malodorous urine.  Urinary frequency depends on her stress level.  If she is anxious about something she will go to the bathroom much more frequently.    Review of Systems   Review of Systems   No f/c/chest pain or sob. No cough. No abd pain/n/v. No ha or dizziness. No numbness, tingling, or weakness.     OBJECTIVE:   /80   Pulse 104   Temp 96.8 °F (36 °C) (Temporal)   Resp 16   Ht 5' 4\" (1.626 m)   Wt 131 lb (59.4 kg)   SpO2 97%   BMI 22.49 kg/m²   Physical Exam   Constitutional: Oriented to person, place, and time. No distress.   HEENT:  Normocephalic and atraumatic.Tympanic membranes appear normal. Oropharynx is clear and moist.   Eyes: Conjunctivae not injected.  Extraocular movements are intact  Neck: Full ROM.  Neck supple.    Cardiovascular: Normal rate, regular rhythm and intact distal pulses.  No murmur, rubs or gallops.   Pulmonary/Chest: Effort normal and breath sounds normal. No  respiratory distress.  Abdominal: Soft. Bowel sounds are present. Non tender, no masses, no organomegaly or hernias.  Musculoskeletal: No edema in bilateral lower extremities.  Strength is 5/5 in bilateral upper and lower extremities.  Lymphadenopathy: No cervical or axillary adenopathy.   Neurological: No focal neurologic deficits.  Cranial nerves II through XII are intact.    Psychiatric: Normal mood and affect.   Lab Results   Component Value Date    GLU 88 05/28/2025    BUN 12 05/28/2025    CREATSERUM 0.70 05/28/2025    BUNCREA SEE NOTE: 05/28/2025    ANIONGAP 6 06/20/2024    GFRAA 91 06/04/2022    GFRNAA 79 06/04/2022    CA 9.7 05/28/2025     05/28/2025    K 4.3 05/28/2025     05/28/2025    CO2 25 05/28/2025    OSMOCALC 291 06/20/2024      Lab Results   Component Value Date    WBC 8.0 11/06/2023    RBC 4.92 11/06/2023    HGB 14.5 11/06/2023    HCT 44.8 11/06/2023    MCV 91.1 11/06/2023    MCH 29.5 11/06/2023    MCHC 32.4 11/06/2023    RDW 11.9 11/06/2023    .0 11/06/2023      Lab Results   Component Value Date    T4F 1.0 06/20/2024    TSH 2.170 06/20/2024    TSHT4 2.15 06/03/2023      ASSESSMENT AND PLAN:       ICD-10-CM    1. Wellness examination  Z00.00       2. Dysuria  R30.0 UA/M With Culture Reflex [E]     CANCELED: UA/M With Culture Reflex [E]      3. Chronic constipation  K59.09 GASTRO - INTERNAL      4. Incontinence of feces with fecal urgency  R15.9 GASTRO - INTERNAL    R15.2       5. Cyst of ovary, unspecified laterality  N83.209 US PELVIS (TRANSABDOMINAL AND TRANSVAGINAL) (CPT=76856/75143)      6. Encounter for screening mammogram for malignant neoplasm of breast  Z12.31 [facility] SUKHDEV 2D+3D SCREENING BILAT (CPT=77067/10005)      7. Onychomycosis  B35.1 PODIATRY - INTERNAL      8. Mixed hyperlipidemia  E78.2 Repeat lipid panel ordered.  For now continue current dose of statin.      9. Adrenal nodule (HCC)  E27.9 Management/surveillance per endocrinology      10. Age-related osteoporosis  without current pathological fracture  M81.0 Stable, continue management per endocrinology continue Prolia injections..       11. Thyroid nodule  E04.1 Stable, surveillance imaging per endocrinology.      12. Vitamin D deficiency  E55.9 Stable, continue calcium and vitamin D supplementation.      13. Generalized anxiety disorder  F41.1 Stable, continue to monitor.  Not on pharmacotherapy      14. Diverticulosis of large intestine without perforation or abscess without bleeding  K57.30 Stable, asymptomatic, continue to monitor      15. History of colonic polyps  Z86.0100 Recall colonoscopy for GI      16. Irritable bowel syndrome with both constipation and diarrhea  K58.2 There is also consideration for possible overflow diarrhea?  Patient is having bowel movements every 1 to 2 days although sometimes hard.  She would rather increase consumption of fiber then start a stool softener.  Referring to GI.      17. Family history of breast cancer  Z80.3 Continue regular screening mammograms until at least 75      18. History of basal cell carcinoma  Z85.828 Continue annual skin checks with dermatology          TODAY'S ORDERS     Orders Placed This Encounter   Procedures    UA/M With Culture Reflex [E]       Meds & Refills:  Requested Prescriptions      No prescriptions requested or ordered in this encounter       Imaging & Consults:  GASTRO - INTERNAL  PODIATRY - INTERNAL  US PELVIS (TRANSABDOMINAL AND TRANSVAGINAL) (CPT=76856/62303)  [facility] SUKHDEV 2D+3D SCREENING BILAT (CPT=77067/75029)    No follow-ups on file.  There are no Patient Instructions on file for this visit.    All questions were answered and the patient agrees with the plan.     Thank you,  Siria Turner, DO

## 2025-06-21 ENCOUNTER — HOSPITAL ENCOUNTER (OUTPATIENT)
Dept: MAMMOGRAPHY | Facility: HOSPITAL | Age: 70
Discharge: HOME | End: 2025-06-21
Attending: INTERNAL MEDICINE
Payer: COMMERCIAL

## 2025-06-21 DIAGNOSIS — Z12.31 ENCOUNTER FOR SCREENING MAMMOGRAM FOR MALIGNANT NEOPLASM OF BREAST: ICD-10-CM

## 2025-06-21 LAB
ABSOLUTE BASOPHILS: 41 CELLS/UL (ref 0–200)
ABSOLUTE EOSINOPHILS: 82 CELLS/UL (ref 15–500)
ABSOLUTE LYMPHOCYTES: 2530 CELLS/UL (ref 850–3900)
ABSOLUTE MONOCYTES: 551 CELLS/UL (ref 200–950)
ABSOLUTE NEUTROPHILS: 3597 CELLS/UL (ref 1500–7800)
ALBUMIN/GLOBULIN RATIO: 1.8 (CALC) (ref 1–2.5)
ALBUMIN: 4.5 G/DL (ref 3.6–5.1)
ALKALINE PHOSPHATASE: 51 U/L (ref 37–153)
ALT: 27 U/L (ref 6–29)
APPEARANCE: CLEAR
AST: 23 U/L (ref 10–35)
BASOPHILS: 0.6 %
BILIRUBIN, TOTAL: 0.4 MG/DL (ref 0.2–1.2)
BILIRUBIN: NEGATIVE
BUN: 15 MG/DL (ref 7–25)
CALCIUM: 9.9 MG/DL (ref 8.6–10.4)
CARBON DIOXIDE: 26 MMOL/L (ref 20–32)
CHLORIDE: 101 MMOL/L (ref 98–110)
CHOL/HDLC RATIO: 3 (CALC)
CHOLESTEROL, TOTAL: 173 MG/DL
COLOR: YELLOW
CREATININE: 0.68 MG/DL (ref 0.6–1)
EGFR: 94 ML/MIN/1.73M2
EOSINOPHILS: 1.2 %
GLOBULIN: 2.5 G/DL (CALC) (ref 1.9–3.7)
GLUCOSE: 94 MG/DL (ref 65–99)
GLUCOSE: NEGATIVE
HDL CHOLESTEROL: 58 MG/DL
HEMATOCRIT: 45.2 % (ref 35–45)
HEMOGLOBIN: 14.1 G/DL (ref 11.7–15.5)
KETONES: NEGATIVE
LDL-CHOLESTEROL: 90 MG/DL (CALC)
LEUKOCYTE ESTERASE: NEGATIVE
LYMPHOCYTES: 37.2 %
MCH: 29.1 PG (ref 27–33)
MCHC: 31.2 G/DL (ref 32–36)
MCV: 93.2 FL (ref 80–100)
MONOCYTES: 8.1 %
MPV: 13.2 FL (ref 7.5–12.5)
NEUTROPHILS: 52.9 %
NITRITE: NEGATIVE
NON-HDL CHOLESTEROL: 115 MG/DL (CALC)
OCCULT BLOOD: NEGATIVE
PH: 5.5 (ref 5–8)
PLATELET COUNT: 242 THOUSAND/UL (ref 140–400)
POTASSIUM: 4.7 MMOL/L (ref 3.5–5.3)
PROTEIN, TOTAL: 7 G/DL (ref 6.1–8.1)
PROTEIN: NEGATIVE
RDW: 12.3 % (ref 11–15)
RED BLOOD CELL COUNT: 4.85 MILLION/UL (ref 3.8–5.1)
SODIUM: 136 MMOL/L (ref 135–146)
SPECIFIC GRAVITY: 1 (ref 1–1.03)
TRIGLYCERIDES: 158 MG/DL
TSH W/REFLEX TO FT4: 2.51 MIU/L (ref 0.4–4.5)
WHITE BLOOD CELL COUNT: 6.8 THOUSAND/UL (ref 3.8–10.8)

## 2025-06-21 PROCEDURE — 77067 SCR MAMMO BI INCL CAD: CPT | Performed by: INTERNAL MEDICINE

## 2025-06-21 PROCEDURE — 77063 BREAST TOMOSYNTHESIS BI: CPT | Performed by: INTERNAL MEDICINE

## 2025-07-11 ENCOUNTER — HOSPITAL ENCOUNTER (OUTPATIENT)
Dept: ULTRASOUND IMAGING | Age: 70
Discharge: HOME | End: 2025-07-11
Attending: INTERNAL MEDICINE
Payer: COMMERCIAL

## 2025-07-11 DIAGNOSIS — N83.209 CYST OF OVARY, UNSPECIFIED LATERALITY: ICD-10-CM

## 2025-07-11 PROCEDURE — 76856 US EXAM PELVIC COMPLETE: CPT | Performed by: INTERNAL MEDICINE

## 2025-07-11 PROCEDURE — 76830 TRANSVAGINAL US NON-OB: CPT | Performed by: INTERNAL MEDICINE

## (undated) DIAGNOSIS — E78.2 MIXED HYPERLIPIDEMIA: ICD-10-CM

## (undated) DIAGNOSIS — E78.2 MIXED HYPERLIPIDEMIA: Primary | ICD-10-CM

## (undated) NOTE — LETTER
7/7/2022              [address] [facility] [id number]         To Whom It May Concern,          I am writing this letter to provide clearance for my patient [place] D. O.B 1/11/1955 to attend [facility] sessions. Please contact our office if there are any additional questions. Thank you.       Sincerely,    Evelyn Horner MD  [place]  ENDOCRINOLOGY  / [address]  [address] [facility] [id number] [phone]